# Patient Record
Sex: FEMALE | Race: WHITE | NOT HISPANIC OR LATINO | Employment: OTHER | URBAN - METROPOLITAN AREA
[De-identification: names, ages, dates, MRNs, and addresses within clinical notes are randomized per-mention and may not be internally consistent; named-entity substitution may affect disease eponyms.]

---

## 2024-02-08 ENCOUNTER — OFFICE VISIT (OUTPATIENT)
Dept: CARDIOLOGY CLINIC | Facility: CLINIC | Age: 69
End: 2024-02-08
Payer: MEDICARE

## 2024-02-08 VITALS
BODY MASS INDEX: 35.53 KG/M2 | SYSTOLIC BLOOD PRESSURE: 142 MMHG | WEIGHT: 181 LBS | OXYGEN SATURATION: 97 % | HEART RATE: 70 BPM | HEIGHT: 60 IN | DIASTOLIC BLOOD PRESSURE: 85 MMHG

## 2024-02-08 DIAGNOSIS — I21.4 NSTEMI (NON-ST ELEVATED MYOCARDIAL INFARCTION) (HCC): ICD-10-CM

## 2024-02-08 DIAGNOSIS — Z95.810 ICD (IMPLANTABLE CARDIOVERTER-DEFIBRILLATOR) IN PLACE: ICD-10-CM

## 2024-02-08 DIAGNOSIS — I25.10 CORONARY ARTERY DISEASE INVOLVING NATIVE CORONARY ARTERY OF NATIVE HEART WITHOUT ANGINA PECTORIS: Primary | ICD-10-CM

## 2024-02-08 DIAGNOSIS — I47.20 VT (VENTRICULAR TACHYCARDIA) (HCC): ICD-10-CM

## 2024-02-08 DIAGNOSIS — E78.00 HYPERCHOLESTEROLEMIA: ICD-10-CM

## 2024-02-08 DIAGNOSIS — I25.10 MULTIPLE VESSEL CORONARY ARTERY DISEASE: ICD-10-CM

## 2024-02-08 DIAGNOSIS — I10 PRIMARY HYPERTENSION: ICD-10-CM

## 2024-02-08 PROCEDURE — 99204 OFFICE O/P NEW MOD 45 MIN: CPT | Performed by: INTERNAL MEDICINE

## 2024-02-08 RX ORDER — ASPIRIN 81 MG/1
81 TABLET ORAL DAILY
COMMUNITY
Start: 2023-12-07

## 2024-02-08 RX ORDER — UREA 10 %
50 LOTION (ML) TOPICAL DAILY
COMMUNITY

## 2024-02-08 RX ORDER — METOPROLOL SUCCINATE 25 MG/1
75 TABLET, EXTENDED RELEASE ORAL EVERY 12 HOURS
COMMUNITY
Start: 2023-12-06

## 2024-02-08 RX ORDER — CLOPIDOGREL BISULFATE 75 MG/1
75 TABLET ORAL DAILY
COMMUNITY
Start: 2023-12-07

## 2024-02-08 RX ORDER — LOSARTAN POTASSIUM 25 MG/1
TABLET ORAL EVERY 24 HOURS
COMMUNITY
End: 2024-02-08 | Stop reason: SDUPTHER

## 2024-02-08 RX ORDER — LOSARTAN POTASSIUM 50 MG/1
50 TABLET ORAL DAILY
Qty: 90 TABLET | Refills: 3 | Status: SHIPPED | OUTPATIENT
Start: 2024-02-08

## 2024-02-08 RX ORDER — EZETIMIBE 10 MG/1
10 TABLET ORAL DAILY
COMMUNITY
Start: 2023-12-06

## 2024-02-08 RX ORDER — AMLODIPINE BESYLATE 10 MG/1
10 TABLET ORAL DAILY
COMMUNITY
Start: 2023-06-14 | End: 2024-06-13

## 2024-02-08 RX ORDER — ATORVASTATIN CALCIUM 80 MG/1
1 TABLET, FILM COATED ORAL EVERY EVENING
COMMUNITY
Start: 2023-12-06

## 2024-02-08 NOTE — PATIENT INSTRUCTIONS
Mediterranean Diet   AMBULATORY CARE:   A Mediterranean diet  is a meal plan that includes foods that are commonly eaten in countries that border the [place]. This meal plan may provide several health benefits. These include losing or maintaining weight, and decreasing blood pressure, blood sugar, and cholesterol levels. It may also help protect against certain health conditions such as heart disease, cancer, type 2 diabetes, and Alzheimer disease. Work with a dietitian to develop a meal plan that is right for you.  Foods to include in the Mediterranean diet:   Include fruits and vegetables in each meal.  Eat a variety of fresh fruits and vegetables.    Choose whole grains every day.  These foods include whole-grain breads, pastas, and cereals. It also includes brown rice, quinoa, and millet.    Use unsaturated fats instead of saturated fats.  Cook with olive or canola oil. Limit saturated fats, such as butter, margarine, and shortening. Saturated fat is an unhealthy fat that can increase your cholesterol levels.    Choose plant foods, poultry, and fish as your main sources of protein.      Eat plant-based foods that provide protein,  such as lentils, beans, chickpeas, nuts, and seeds. Choose mostly plant-based foods in place of meat on most days of the week.    Eat protein foods high in omega-3 fats.  Fish high in omega-3 fats include salmon, trout, and tuna. Include these types of fish 1 or 2 times each week. Limit fish high in mercury, such as shark, swordfish, tilefish, and liam mackerel. Omega-3 fats are also found in walnuts and flaxseed.         Choose poultry (chicken or turkey)  without skin instead of red meat. Red meat is high in saturated fat. Limit eggs and high-fat meats, such as beltrán, sausage, and hot dogs.    Choose low-fat dairy foods  such as nonfat or 1% milk, or low-fat almond, cashew, or soy milk. Other examples include low-fat cheese, yogurt, and cottage cheese.    Limit sweets.   Limit your intake of high-sugar foods, such as soda, desserts, and candy.    Talk to your healthcare provider about alcohol.  Studies have shown that moderate intake of wine may reduce the risk of heart disease. A moderate amount of wine is 1 serving for women and men 65 years and older each day. Two servings is recommended for men 21 to 64 years of age each day. A serving of wine is 5 ounces.    Other things you need to know if you follow the Mediterranean diet:   Include foods high in iron and vitamin C.  Plant-based foods that are high in iron include spinach, beans, tofu, and artichoke. Eat a serving of vitamin C with any iron-rich food to help your body absorb more iron. Examples include oranges, strawberries, cantaloupe, broccoli, and yellow peppers.         Get regular physical activity.  The Mediterranean diet will have the most benefit if you get regular physical activity. Get 30 minutes of physical activity at least 5 days a week. Choose physical activities that increase your heart rate. Examples include walking, hiking, swimming, and riding a bike. Ask your healthcare provider about the best exercise plan for you.       © Copyright [employer] 2023 Information is for End User's use only and may not be sold, redistributed or otherwise used for commercial purposes.  The above information is an  only. It is not intended as medical advice for individual conditions or treatments. Talk to your doctor, nurse or pharmacist before following any medical regimen to see if it is safe and effective for you.

## 2024-02-08 NOTE — PROGRESS NOTES
Cardiology Consultation     Michelle Espino  [id number]  1955  [facility] [address] [address] [phone]    1. Coronary artery disease involving native coronary artery of native heart without angina pectoris  Ambulatory  Referral to Cardiac Rehabilitation    CBC (Includes Diff/Plt) (Refl)    Comprehensive metabolic panel    Lipid Panel With Direct LDL      2. Multiple vessel coronary artery disease  Ambulatory  Referral to Cardiac Rehabilitation      3. Primary hypertension  losartan (COZAAR) 50 mg tablet      4. NSTEMI (non-ST elevated myocardial infarction) (HCC)  Ambulatory  Referral to Cardiac Rehabilitation      5. VT (ventricular tachycardia) ([facility])        6. Hypercholesterolemia        7. ICD (implantable cardioverter-defibrillator) in place            Discussion/Summary:    1.  Multivessel CAD - After a recent NSTEMI and VT/PEA arrest Michelle was found to have multivessel disease.  The culprit of her MI appeared to be an occluded OM1, but she had diffuse disease of her left circumflex starting proximally along with a mid long eccentric stenosis of her LAD at 80%.  She was not a candidate for intervention, and CT surgery was consulted.  They stated there was no good targets distally and medical management was recommended.  She is here for a second opinion and she did bring her records with her cardiac catheterization films.  I am going to have interventional cardiology and CT surgery review these with me to determine the feasibility of CABG versus PCI.            Fortunately she has not had any angina since coming out of the hospital, but she has not exerted herself to the degree she used to.  She is also having some shortness of breath.  I am going to refer her to cardiac rehabilitation.  She will continue medical therapy that includes dual antiplatelet therapy, beta-blocker and  statin.  Her LV function is normal by echocardiogram.  Follow-up will be determined after we review her films.    2.   Ventricular tachycardia - At the time of her presentation she had VTA that was reported integrate into PEA arrest, that only lasted less than a minute.  She received lidocaine and CPR.  She is now status post ICD and on beta-blocker therapy.  We are going to get her established with our device clinic to continue to follow for recurrent arrhythmias.    3.   Hypertension - Her blood pressure is mildly elevated.  It is recommended that she follows this at home.  She will continue Toprol-XL at 75 mg twice daily and we will increase losartan to 50 mg daily.  She is also on amlodipine 10 mg daily.    4.  Hypercholesterolemia - She is now on atorvastatin 80 mg daily with the addition of Zetia 10 mg daily.  We did order updated blood work.        HPI:    Mrs. Espino comes in today as a new patient for second opinion given her recent cardiac history and events.  She is a 68-year-old female who came into the hospital on 12/1/2023 with a couple days of what started as jaw pain but then progressed to substernal chest pain.  Prior to this she had no cardiac history and was treated for hypertension and hyperlipidemia.  While en route to the hospital via EMS she was noted to have pulseless VT and this degraded into what was reported as a PEA arrest.  She received an IV dose of lidocaine and CPR.  Her arrest did not last very long, less than a minute, and upon arrival to the emergency department ECG showed a new left bundle branch block.  She subsequently had a significantly elevated troponin.    At that point she was taken to the cardiac Cath Lab in which she had multivessel disease.  She had a eccentric long mid LAD 80% stenosis, a long diffuse 80% LCx stenosis starting proximally with a medium caliber OM1, that was torturous and occluded distally, and just mild plaquing of the RCA.  They reported they felt the  culprit of her NSTEMI was the occlusion of OM1.  They did not feel intervention was the best course given the long diffuse stenoses proximal to this in the LAD and therefore they consulted CT surgery.  Echocardiogram did show a preserved LV function.  By report, the patient was told she was not a candidate for CABG due to suboptimal coronary targets given her diffuse disease.  At that point she was placed on maximal and goal-directed medical therapy and an ICD was placed for her VT given the fact that she was not revascularized.  Follow-up was arranged, but then her and her  sought a second opinion today here with [facility].    Since getting out of the hospital Michelle is felt well.  She has not had any return of angina type symptoms that took her to the hospital.  She has been active but is not quite at the activity she once was.  She used to do the treadmill but has not done this since getting out of the hospital.  She is tolerating all of her medical therapy.  6 she will get some shortness of breath with exertion, particularly if she goes up stairs.  She usually will take a break and this will go away rather quickly.  She has not had any shortness of breath at rest.  No signs or symptoms of CHF.  She told me she was on a diuretic temporarily, but is currently not on 1.  She denies palpitations, lightheadedness or syncope.      Patient Active Problem List   Diagnosis    Coronary artery disease involving native coronary artery of native heart without angina pectoris    Multiple vessel coronary artery disease    NSTEMI (non-ST elevated myocardial infarction) (HCC)    VT (ventricular tachycardia) (HCC)    Hypercholesterolemia    ICD (implantable cardioverter-defibrillator) in place     History reviewed. No pertinent past medical history.  Social History     Socioeconomic History    Marital status: /Civil Union     Spouse name: Not on file    Number of children: Not on file    Years of education: Not on  file    Highest education level: Not on file   Occupational History    Not on file   Tobacco Use    Smoking status: Not on file    Smokeless tobacco: Not on file   Substance and Sexual Activity    Alcohol use: Not on file    Drug use: Not on file    Sexual activity: Not on file   Other Topics Concern    Not on file   Social History Narrative    Not on file     Social Determinants of Health     Financial Resource Strain: Not on file   Food Insecurity: Not on file   Transportation Needs: Not on file   Physical Activity: Not on file   Stress: Not on file   Social Connections: Not on file   Intimate Partner Violence: Not on file   Housing Stability: Not on file      Family History   Problem Relation Age of Onset    Hypertension Mother     Breast cancer Mother     Heart attack Father     Hypertension Sister     Cancer Sister     Heart attack Brother      Past Surgical History:   Procedure Laterality Date    CHOLECYSTECTOMY         Current Outpatient Medications:     amLODIPine (NORVASC) 10 mg tablet, Take 10 mg by mouth daily, Disp: , Rfl:     aspirin (ECOTRIN LOW STRENGTH) 81 mg EC tablet, Take 81 mg by mouth daily, Disp: , Rfl:     atorvastatin (LIPITOR) 80 mg tablet, Take 1 tablet by mouth every evening, Disp: , Rfl:     Cholecalciferol (Vitamin D3) 25 MCG (1000 UT) CAPS, Take 1,000 Units by mouth daily, Disp: , Rfl:     clopidogrel (PLAVIX) 75 mg tablet, Take 75 mg by mouth daily, Disp: , Rfl:     ezetimibe (ZETIA) 10 mg tablet, Take 10 mg by mouth daily, Disp: , Rfl:     losartan (COZAAR) 50 mg tablet, Take 1 tablet (50 mg total) by mouth daily, Disp: 90 tablet, Rfl: 3    metoprolol succinate (TOPROL-XL) 25 mg 24 hr tablet, Take 75 mg by mouth every 12 (twelve) hours, Disp: , Rfl:     Omega-3 Fatty Acids (OMEGA-3 FISH OIL PO), Take 1 capsule by mouth in the morning, Disp: , Rfl:     Thiamine HCl (Vitamin B1) 50 MG TABS, Take 50 mg by mouth daily, Disp: , Rfl:   Allergies   Allergen Reactions    Codeine Nausea Only  and Other (See Comments)     Nausea and abdominal pain     Vitals:    02/08/24 1411   BP: 142/85   BP Location: Right arm   Patient Position: Sitting   Cuff Size: Standard   Pulse: 70   SpO2: 97%   Weight: 82.1 kg (181 lb)   Height: 5' (1.524 m)       Labs:  Labs reviewed from her hospitalization in its entirety.    Imaging:  ECG obtained at the time of her hospitalization shows sinus rhythm with a left bundle branch block.    Review of Systems:  Review of Systems   Constitutional:  Positive for fatigue.   HENT: Negative.     Eyes: Negative.    Respiratory:  Positive for shortness of breath.    Cardiovascular: Negative.    Gastrointestinal: Negative.    Musculoskeletal: Negative.    Skin: Negative.    Allergic/Immunologic: Negative.    Neurological: Negative.    Hematological: Negative.    Psychiatric/Behavioral: Negative.     All other systems reviewed and are negative.    Vitals:    02/08/24 1411   BP: 142/85   BP Location: Right arm   Patient Position: Sitting   Cuff Size: Standard   Pulse: 70   SpO2: 97%   Weight: 82.1 kg (181 lb)   Height: 5' (1.524 m)       Physical Exam  Vitals and nursing note reviewed.   Constitutional:       Appearance: She is well-developed.   HENT:      Head: Normocephalic and atraumatic.   Eyes:      General: No scleral icterus.        Right eye: No discharge.         Left eye: No discharge.      Pupils: Pupils are equal, round, and reactive to light.   Neck:      Thyroid: No thyromegaly.      Vascular: No JVD.   Cardiovascular:      Rate and Rhythm: Normal rate and regular rhythm. No extrasystoles are present.     Pulses: Normal pulses. No decreased pulses.      Heart sounds: Normal heart sounds, S1 normal and S2 normal. No murmur heard.     No friction rub. No gallop.   Pulmonary:      Effort: Pulmonary effort is normal. No respiratory distress.      Breath sounds: Normal breath sounds. No wheezing, rhonchi or rales.   Abdominal:      General: Bowel sounds are normal. There is no  distension.      Palpations: Abdomen is soft.      Tenderness: There is no abdominal tenderness.   Musculoskeletal:         General: No tenderness or deformity. Normal range of motion.      Cervical back: Normal range of motion and neck supple.      Right lower leg: No edema.      Left lower leg: No edema.   Skin:     General: Skin is warm and dry.      Findings: No rash.   Neurological:      Mental Status: She is alert and oriented to person, place, and time.      Cranial Nerves: No cranial nerve deficit.   Psychiatric:         Thought Content: Thought content normal.         Judgment: Judgment normal.     Counseling / Coordination of Care  Total office time spent today 45 minutes.  Greater than 50% of total time was spent with the patient and / or family counseling and / or coordination of care.

## 2024-03-04 ENCOUNTER — TELEPHONE (OUTPATIENT)
Dept: CARDIOLOGY CLINIC | Facility: CLINIC | Age: 69
End: 2024-03-04

## 2024-03-04 NOTE — TELEPHONE ENCOUNTER
Pt left a message asking if you received her records, and if you had a chance to review them?    [phone]

## 2024-03-11 ENCOUNTER — CLINICAL SUPPORT (OUTPATIENT)
Dept: CARDIAC REHAB | Facility: HOSPITAL | Age: 69
End: 2024-03-11
Attending: INTERNAL MEDICINE
Payer: MEDICARE

## 2024-03-11 DIAGNOSIS — I21.4 NSTEMI (NON-ST ELEVATED MYOCARDIAL INFARCTION) (HCC): Primary | ICD-10-CM

## 2024-03-11 DIAGNOSIS — I25.10 MULTIPLE VESSEL CORONARY ARTERY DISEASE: ICD-10-CM

## 2024-03-11 DIAGNOSIS — I25.10 CORONARY ARTERY DISEASE INVOLVING NATIVE CORONARY ARTERY OF NATIVE HEART WITHOUT ANGINA PECTORIS: ICD-10-CM

## 2024-03-11 PROCEDURE — 93797 PHYS/QHP OP CAR RHAB WO ECG: CPT

## 2024-03-11 NOTE — PROGRESS NOTES
CARDIAC REHAB ASSESSMENT    Today's date: 2024  Patient name: Michelle Espino     : 1955       MRN: [id number]  PCP: Loly Khan DO  Referring Physician: Shay Garcia MD  Cardiologist: Dr. Garcia  Surgeon: n/a  Dx:   Encounter Diagnoses   Name Primary?    Coronary artery disease involving native coronary artery of native heart without angina pectoris     Multiple vessel coronary artery disease     NSTEMI (non-ST elevated myocardial infarction) ([facility])        Date of onset: 2023  Cultural needs: n/a    Weight  182.2 lb     Height:   Ht Readings from Last 1 Encounters:   24 5' (1.524 m)     Medical History: No past medical history on file.      Physical Limitations: none    Fall Risk: Low   Comments: Ambulates with a steady gait with no assist device    Anginal Equivalent: Chest Pain   NTG use: No prescription    Risk Factors   Cholesterol: Yes  Smoking: Never used  HTN: Yes  DM: No  Obesity: Yes   Inactivity: No  Stress:  perceived  stress: 4/10   Stressors:reports minimal stress   Goals for Stress Management:Practice Relaxation Techniques, Exercise, Keep a positive mindset, Spend time outside, Enjoy a hobby, and Spend time with family    Family History:  Family History   Problem Relation Age of Onset    Hypertension Mother     Breast cancer Mother     Heart attack Father     Hypertension Sister     Cancer Sister     Heart attack Brother        Allergies: Codeine    ETOH:   Social History     Substance and Sexual Activity   Alcohol Use Not on file       Current Medications:   Current Outpatient Medications   Medication Sig Dispense Refill    amLODIPine (NORVASC) 10 mg tablet Take 10 mg by mouth daily      aspirin (ECOTRIN LOW STRENGTH) 81 mg EC tablet Take 81 mg by mouth daily      atorvastatin (LIPITOR) 80 mg tablet Take 1 tablet by mouth every evening      Cholecalciferol (Vitamin D3) 25 MCG (1000 UT) CAPS Take 1,000 Units by mouth daily      clopidogrel (PLAVIX) 75 mg tablet Take  75 mg by mouth daily      ezetimibe (ZETIA) 10 mg tablet Take 10 mg by mouth daily      losartan (COZAAR) 50 mg tablet Take 1 tablet (50 mg total) by mouth daily 90 tablet 3    metoprolol succinate (TOPROL-XL) 25 mg 24 hr tablet Take 75 mg by mouth every 12 (twelve) hours      Omega-3 Fatty Acids (OMEGA-3 FISH OIL PO) Take 1 capsule by mouth in the morning      Thiamine HCl (Vitamin B1) 50 MG TABS Take 50 mg by mouth daily       No current facility-administered medications for this visit.         Functional Status Prior to Diagnosis for Treatment   Occupation: retired  Recreation: none  ADL’s: No limitations  [place]: No limitations  Exercise: walking on TM 30 min daily  Other: n/a    Current Functional Status  Occupation: retired  Recreation: none  ADL’s:resumed all ADLs limited by Dyspnea  [place]: No limitations  Exercise: has returned to 10 min daily on TM  Home exercise equipment: treadmill  Other: n/a      Patient Specific Goals:     EXERCISE GOALS (home exercise, ADLs):   Be able to walk up hill at home without having to stop due to shortness of breath  Continue to increase time on TM back to at least 30 min daily   NUTRITION GOALS (wt control, diabetes management, dietary modifications):   Continue current eating plan-following diabetic eating plan along with her   Lose weight - 10 lbs ST   PSYCHOCOSOCIAL GOALS (stress, emotional well being, social support):   Continue to have good relationships and support from family and friends      CORE COMPONENT GOALS (smoking, BP control, angina control, medication):   Manage BP with medication, diet, and exercise  Get second opinion on if PCI or CABG is possible to open 80% blockage in LAD       Ability to reach goals/rehabilitation potential:  Excellent    Projected return to function: 12 weeks  Objective tests: sub-max TM ETT      Nutritional   Reviewed details of Rate your Plate. Discussed key elements of heart healthy eating. Reviewed patient  goals for dietary modifications and their clinical implications.  Reviewed most recent lipid profile.     Goals for dietary modification (based on Rate Your Plate Dietary Assessment)   LDL <100, HDL >40, TRG <150, and CHOL <200    Psychosocial Assessment as it relates to rehabilitation  Are there any aspects of the patient's family and home situation that will affect their rehabilitation?  no    Assessment of depression and anxiety   Patient reports they are coping well with good social support and denies depression or anxiety  Reports sufficient emotional support     Psychosocial Evaluation    PHQ-9: 4  1-4 = Minimal Depression  ESTELA-7: 4  0-4  = Not anxious       Marital status:   Social Support: spouse, children, and friends    Domestic Violence Screening: No      REPORT OF CURRENT CARDIAC EVENT: per Dr. Garcia's note on 2/8/2024 she went to [facility] on [facility]/2023 with a couple days of what started as jaw pain but then progressed to substernal chest pain. Prior to this she had no cardiac history and was treated for hypertension and hyperlipidemia. While en route to the hospital via EMS she was noted to have pulseless VT and this degraded into what was reported as a PEA arrest. She received an IV dose of lidocaine and CPR. She had a eccentric long mid LAD 80% stenosis, a long diffuse 80% LCx stenosis starting proximally with a medium caliber OM1, that was torturous and occluded distally, and just mild plaquing of the RCA.  They reported they felt the culprit of her NSTEMI was the occlusion of OM1.  They did not feel intervention was the best course given the long diffuse stenoses proximal to this in the LAD and therefore they consulted CT surgery.  Echocardiogram did show a preserved LV function.  By report, the patient was told she was not a candidate for CABG due to suboptimal coronary targets given her diffuse disease.  At that point she was placed on maximal and goal-directed medical therapy and  an ICD was placed for her VT given the fact that she was not revascularized     OTHER CARDIAC HISTORY: none    COMMENTS: n/a

## 2024-03-11 NOTE — PROGRESS NOTES
Cardiac Rehabilitation Plan of Care   INITIAL           Today's date: 3/11/2024   # of Exercise Sessions Completed: 1-evaluation  Patient name: Michelle Espino      : 1955  Age: 68 y.o.       MRN: [id number]  Referring Physician: Shay Garcia MD  Cardiologist: Dr. Shay Garcia    Provider: Elisabeth  Clinician: Yuni Evans MS, CEP      Dx:   Encounter Diagnoses   Name Primary?    Coronary artery disease involving native coronary artery of native heart without angina pectoris     Multiple vessel coronary artery disease     NSTEMI (non-ST elevated myocardial infarction) ([facility])        Description of Diagnosis: She had a eccentric long mid LAD 80% stenosis, a long diffuse 80% LCx stenosis starting proximally with a medium caliber OM1, that was torturous and occluded distally, and just mild plaquing of the RCA.  They reported they felt the culprit of her NSTEMI was the occlusion of OM1.     Date of onset: 2023        Dr. Garcia,   Please review the following patient assessment for Michelle to begin cardiac rehabilitation post NSTEMI.  Please verify you agree with the outlined plan of care and exercise prescription by signing with attached order.    Thank You.      SUMMARY OF PROGRESS:  Mrs. Espino is here today for her cardiac rehabilitation evaluation after recent NSTEMI. She went into the hospital on 2023 with a couple days of what started as jaw pain but then progressed to substernal chest pain. Prior to this she had no cardiac history and was treated for hypertension and hyperlipidemia. While en route to the hospital via EMS she was noted to have pulseless VT and this degraded into what was reported as a PEA arrest. She received an IV dose of lidocaine and CPR.  She had a eccentric long mid LAD 80% stenosis, a long diffuse 80% LCx stenosis starting proximally with a medium caliber OM1, that was torturous and occluded distally, and just mild plaquing of the RCA. They reported they felt the  culprit of her NSTEMI was the occlusion of OM1.  They did not feel intervention was the best course given the long diffuse stenoses proximal to this in the LAD and therefore they consulted CT surgery. She was not a candidate for CABG. She made an appointment to see Dr. Garcia at [facility] so she could have a second opinion. She is waiting to hear back from Dr. Garcia after he has CT review. She had ICD implanted as well due to not being revascularized.  In the meantime, she is ready to start cardiac rehab. She reports she had been walking on her treadmill at home for 30 min daily until her heart attack. She is a little afraid to walk for 30 min since then due to MI starting while on treadmill and knowing she still has blockage in her artery. She has been walking for 10 min daily since MI. She reports following a diabetic diet along with her  who is diabetic. She follows low salt, low card, low fat diet most of the time.   She denies depression (PHQ-9=4) at this time. She does have some feelings of anxiety (ESTELA-7=4) since MI. She is hoping doing rehab and having supervised exercise will help that. She was given information on [employer] today and encouraged to enroll.   She completed TM ETT today reaching stage III at 4.3 METs at 8 min. She needed to stop due to shortness of breath. She did not reach THR. Telemetry shows LBBB. Will plan to progress exercise times and intensities as she tolerates over first 30 days of rehab. She has been instructed to let us know if she develops any angina or other symptoms with exercise.   She is in agreement to attend cardiac rehab sessions 3x/week for 12 weeks, or up to 36 sessions. She will plan to participate in weekly education sessions on cardiac risk factor management. She will start her sessions on 3/14/2024.      Medication compliance: Yes   Comments: Pt reports to be compliant with medications    Fall Risk: Low   Comments: Ambulates with a steady gait with no assist  device      EXERCISE ASSESSMENT and PLAN    ECG Interpretation: SR, LBBB    SMART Goals:   10% improvement in functional capacity based on max METs achieved in initial fitness assessment  reduced dyspnea with physical activity    improved DASI score by 10%  increased exercise capacity by 40% based on peak METs tolerated in cardiac rehab exercise session  maintain > 150 minutes per week of moderate intensity exercise    Patient Specific EXERCISE GOALS:   (home exercise, ADLs, recreation):  resume ADLs with increased strength, attend rehab regularly, and return to regular exercise regimen  Be able to walk up hill at home without having to stop due to shortness of breath  Continue to increase time on TM back to at least 30 min daily    Patient's progress toward SMART and personal goals: enrolled in CR today and is walking on treadmill at home 10 min daily since MI    Plan For next 30 days: education on home exercise guidelines, home exercise 30+ mins 2 days opposite CR, Group class: Risk Factors for Heart Disease, and patient will continue to increase minutes on TM at home to resume 30 min daily    Current Aerobic Exercise Prescription:      Frequency: 3 days/week   Supplement with home exercise 2+ days/wk as tolerated       Minutes: 20 - 40         METS: 2.5-3.5            HR: (50-85% HRR or RHR +30-40bpm):     RPE: 4-6         Modalities: Treadmill, UBE, Lifecycle, Rower, NuStep, and Recumbent bike     Exercise workloads will be progressed gradually as tolerated, within limits of patient's ability, and according to the patient's response to the exercise program.      30 Day Goals for Aerobic Exercise Progression:    Frequency: 3 days/week of cardiac rehab       Supplement with home exercise 2+ days/wk as tolerated    Minutes: 40                            >150 mins/wk of moderate intensity exercise   METS: 2.5-3.5   HR:      RPE: 4-6   Modalities: Treadmill, UBE, Lifecycle, and Rower, Nustep, Rec  Bike    Strength trainin-3 days / week  12-15 repetitions  1-2 sets per modality   Will be added following 2-3 weeks of monitored exercise sessions   Modalities: Pull Downs, Lateral Raise, Arm Extension, Arm Curl, Upright Rows, Front Raises, and leg ext    Home Exercise: Type: walking on TM, Frequency: 7 days/week, Duration: 10 mins    Group and Individual Education: benefit of exercise for CAD risk factors, home exercise guidelines, AHA guidelines to achieve >150 mins/wk of moderate exercise, RPE scale, and Group class: Risk Factors for Heart Disease       Readiness to change: Maintenance: (Maintaining the behavior change)      NUTRITION ASSESSMENT AND PLAN    Weight control:    Starting weight: 182.2 lb   Current weight:         Diabetes: N/A  A1c: 6.1    last measured: 2023    Lipid management: Discussed diet and lipid management and Last lipid profile 2023  Chol 233    HDL 58      SMART Goals:   LDL <100, HDL >40, TRG <150, CHOL <200, Wt. loss 1 ppw,  goal of -10 lbs., and 2.5-5%  wt loss    Patient Specific NUTRITION GOALS:  (wt control, diabetes management, dietary modifications): Continue current eating plan-following diabetic eating plan along with her   Lose weight - 10 lbs ST    Patient's progress toward SMART and personal goals: currently follows low fat, low sat, low carb eating plan with her , plans to increase exercise time to help work towards weight loss goal    Plan for next 30 days: patient specific plan: continue current eating plan, increase exercise time to reach 150-200 min/week of aerobic exercise, group class: Reading Food Labels, and group Class: Heart Healthy Eating    Measurable goals were based Rate Your Plate Dietary Self-Assessment. These are the areas in which the patient could score higher on the assessment.  Goals include recommendations for a heart healthy diet based on American Heart Association.    Group and Individual Education: heart  healthy eating principles  weight loss and management strategies  low sodium diet  maintaining hydration  nutrition for  lipid management  portion control    Readiness to change: Preparation:  (Getting ready to change)       PSYCHOSOCIAL ASSESSMENT AND PLAN    Emotional:  Depression assessment:  PHQ-9 = 4  1-4 = Minimal Depression            Anxiety measure:  ESTELA-7 = 4  0-4  = Not anxious    Assessment of depression and anxiety    Patient reports they are coping well with good social support and denies depression or anxiety  Reports sufficient emotional support     Self-reported stress level:  4  Stress Management: Practice Relaxation Techniques, Exercise, Keep a positive mindset, Spend time outside, Enjoy a hobby, and Spend time with family    Patient's rating of Social support: Excellent   Social Support Network: spouse and children    Psychosocial Assessment as it relates to rehabilitation:   Patient denies issues with his/her family or home life that may affect their rehabilitation efforts.     SMART Goals:     Physical Fitness in [place] Score < 3, Daily Activity in [place] Score < 3, reduced time feeling nervous or on edge, control or stop worrying, take time to relax, and avoid thoughts of feeling as though something bad may happen    Patient Specific PSYCHOCOSOCIAL GOALS:  (stress, emotional well being, social support):  begin using [employer] and spend time with family    Patient's progress toward SMART and personal goals: Continues to have good relationships and support from family and friends     Plan For next 30 days: Enroll in [employer], Exercise, and Keep a positive mindset    Group and Individual Education: signs/sxs of depression, benefits of a positive support system, and benefits of enrolling in [employer]    Readiness to change: Preparation:  (Getting ready to change)       OTHER CORE COMPONENTS     Tobacco:   Social History     Tobacco Use   Smoking Status Not on file   Smokeless  Tobacco Not on file       Tobacco Use Intervention:   N/A:  Patient is a non-smoker     Anginal Symptoms:   chest, jaw pain   NTG use: No prescription    Blood pressure:    Restin/72   Exercise: 148/72    SMART Goals: consistent, controlled resting BP < 130/80 and medication compliance      Patient Specific CORE COMPONENT GOALS (smoking, BP control, angina control, medication): reduced dietary sodium <2000mg and medication compliance  Manage BP with medication, diet, and exercise  Get second opinion on if PCI or CABG is possible to open 80% blockage in LAD    Patient's progress toward SMART and personal goals: taking medication 100% of time, following low salt diet, will be increasing exercise time at CR and on treadmill at home to reach 150-200 min.week of aerobic exercise    Plan For next 30 days: patient specific plan: will be increasing exercise time at CR and on treadmill at home to reach 150-200 min.week of aerobic exercise, group class: Understanding Heart Disease, group class: Common Heart Medications, and medication compliance    Group and Individual Education:  understanding high blood pressure and it's relationship to CAD and components of blood pressure management    Readiness to change: Action:  (Changing behavior)

## 2024-03-14 ENCOUNTER — CLINICAL SUPPORT (OUTPATIENT)
Dept: CARDIAC REHAB | Facility: HOSPITAL | Age: 69
End: 2024-03-14
Attending: INTERNAL MEDICINE
Payer: MEDICARE

## 2024-03-14 DIAGNOSIS — I21.4 NSTEMI (NON-ST ELEVATED MYOCARDIAL INFARCTION) (HCC): Primary | ICD-10-CM

## 2024-03-14 PROCEDURE — 93798 PHYS/QHP OP CAR RHAB W/ECG: CPT

## 2024-03-19 ENCOUNTER — CLINICAL SUPPORT (OUTPATIENT)
Dept: CARDIAC REHAB | Facility: HOSPITAL | Age: 69
End: 2024-03-19
Attending: INTERNAL MEDICINE
Payer: MEDICARE

## 2024-03-19 DIAGNOSIS — I21.4 NSTEMI (NON-ST ELEVATED MYOCARDIAL INFARCTION) (HCC): Primary | ICD-10-CM

## 2024-03-19 PROCEDURE — 93798 PHYS/QHP OP CAR RHAB W/ECG: CPT

## 2024-03-21 ENCOUNTER — CLINICAL SUPPORT (OUTPATIENT)
Dept: CARDIAC REHAB | Facility: HOSPITAL | Age: 69
End: 2024-03-21
Attending: INTERNAL MEDICINE
Payer: MEDICARE

## 2024-03-21 DIAGNOSIS — I21.4 NSTEMI (NON-ST ELEVATED MYOCARDIAL INFARCTION) (HCC): Primary | ICD-10-CM

## 2024-03-21 PROCEDURE — 93798 PHYS/QHP OP CAR RHAB W/ECG: CPT

## 2024-03-22 ENCOUNTER — CLINICAL SUPPORT (OUTPATIENT)
Dept: CARDIAC REHAB | Facility: HOSPITAL | Age: 69
End: 2024-03-22
Attending: INTERNAL MEDICINE
Payer: MEDICARE

## 2024-03-22 DIAGNOSIS — I21.4 NSTEMI (NON-ST ELEVATED MYOCARDIAL INFARCTION) (HCC): Primary | ICD-10-CM

## 2024-03-22 PROCEDURE — 93798 PHYS/QHP OP CAR RHAB W/ECG: CPT

## 2024-03-26 ENCOUNTER — CLINICAL SUPPORT (OUTPATIENT)
Dept: CARDIAC REHAB | Facility: HOSPITAL | Age: 69
End: 2024-03-26
Attending: INTERNAL MEDICINE
Payer: MEDICARE

## 2024-03-26 DIAGNOSIS — I21.4 NSTEMI (NON-ST ELEVATED MYOCARDIAL INFARCTION) (HCC): Primary | ICD-10-CM

## 2024-03-26 PROCEDURE — 93798 PHYS/QHP OP CAR RHAB W/ECG: CPT

## 2024-03-28 ENCOUNTER — CLINICAL SUPPORT (OUTPATIENT)
Dept: CARDIAC REHAB | Facility: HOSPITAL | Age: 69
End: 2024-03-28
Attending: INTERNAL MEDICINE
Payer: MEDICARE

## 2024-03-28 DIAGNOSIS — I21.4 NSTEMI (NON-ST ELEVATED MYOCARDIAL INFARCTION) (HCC): Primary | ICD-10-CM

## 2024-03-28 PROCEDURE — 93798 PHYS/QHP OP CAR RHAB W/ECG: CPT

## 2024-03-29 ENCOUNTER — CLINICAL SUPPORT (OUTPATIENT)
Dept: CARDIAC REHAB | Facility: HOSPITAL | Age: 69
End: 2024-03-29
Attending: INTERNAL MEDICINE
Payer: MEDICARE

## 2024-03-29 DIAGNOSIS — I21.4 NSTEMI (NON-ST ELEVATED MYOCARDIAL INFARCTION) (HCC): Primary | ICD-10-CM

## 2024-03-29 PROCEDURE — 93798 PHYS/QHP OP CAR RHAB W/ECG: CPT

## 2024-04-02 ENCOUNTER — APPOINTMENT (OUTPATIENT)
Dept: CARDIAC REHAB | Facility: HOSPITAL | Age: 69
End: 2024-04-02
Attending: INTERNAL MEDICINE
Payer: MEDICARE

## 2024-04-04 ENCOUNTER — CLINICAL SUPPORT (OUTPATIENT)
Dept: CARDIAC REHAB | Facility: HOSPITAL | Age: 69
End: 2024-04-04
Attending: INTERNAL MEDICINE
Payer: MEDICARE

## 2024-04-04 DIAGNOSIS — I21.4 NSTEMI (NON-ST ELEVATED MYOCARDIAL INFARCTION) (HCC): Primary | ICD-10-CM

## 2024-04-04 PROCEDURE — 93798 PHYS/QHP OP CAR RHAB W/ECG: CPT

## 2024-04-05 ENCOUNTER — CLINICAL SUPPORT (OUTPATIENT)
Dept: CARDIAC REHAB | Facility: HOSPITAL | Age: 69
End: 2024-04-05
Attending: INTERNAL MEDICINE
Payer: MEDICARE

## 2024-04-05 DIAGNOSIS — I21.4 NSTEMI (NON-ST ELEVATED MYOCARDIAL INFARCTION) (HCC): Primary | ICD-10-CM

## 2024-04-05 PROCEDURE — 93798 PHYS/QHP OP CAR RHAB W/ECG: CPT

## 2024-04-09 ENCOUNTER — CLINICAL SUPPORT (OUTPATIENT)
Dept: CARDIAC REHAB | Facility: HOSPITAL | Age: 69
End: 2024-04-09
Attending: INTERNAL MEDICINE
Payer: MEDICARE

## 2024-04-09 DIAGNOSIS — I21.4 NSTEMI (NON-ST ELEVATED MYOCARDIAL INFARCTION) (HCC): Primary | ICD-10-CM

## 2024-04-09 PROCEDURE — 93798 PHYS/QHP OP CAR RHAB W/ECG: CPT

## 2024-04-09 NOTE — PROGRESS NOTES
Cardiac Rehabilitation Plan of Care   30 DAY          Today's date: 2024   # of Exercise Sessions Completed: 11  Patient name: Michelle Espino      : 1955  Age: 68 y.o.       MRN: [id number]  Referring Physician: Shay Garcia MD  Cardiologist: Dr. Shay Garcia    Provider: Elisabeth  Clinician: Mariza Pablo MS       Dx:   Encounter Diagnosis   Name Primary?    NSTEMI (non-ST elevated myocardial infarction) ([facility]) Yes       Description of Diagnosis: She had a eccentric long mid LAD 80% stenosis, a long diffuse 80% LCx stenosis starting proximally with a medium caliber OM1, that was torturous and occluded distally, and just mild plaquing of the RCA.  They reported they felt the culprit of her NSTEMI was the occlusion of OM1.     Date of onset: 2023          SUMMARY OF PROGRESS:  Michelle has attended 10 exercise sessions in the past 30 days.   She tolerates 30-40 mins at 2.8 - 3.0 METs.  A light strength training component has not been added to their exercise program. and will be added in a future exercise session..   She is tolerating progression of intensity levels to maintain RPE 4-6.   Resting BP  108/64 - 154/92 with  hypertensive  response to exercise reaching 134/74- 198/88.  BBB on tele.  RHR 56 - 81  with Normal response to exercise reaching 76 - 136.    No cardiac complaints, but does report post exercise she notices swelling in her ankles and hands, her cardiologist was notified via tiger text and patient has reached out as well. Pt has been supplementing exercise at home walking for 15 minutes on her treadmill 2x/wk, she tolerates it well.  Patient attends group educational classes on cardiac risk factor modification.   Her exercise program will be progressed as tolerated to maintain RPE 4-6.  They will continue to be educated on lifestyle modification and encouraged to supplement with a home exercise program as tolerated. Pts goals included to increase veggie and fruit intake,  increase her overall daily hydration and has a weight loss goal of 30 lbs.        3/11 Mrs. Espino is here today for her cardiac rehabilitation evaluation after recent NSTEMI. She went into the hospital on 12/1/2023 with a couple days of what started as jaw pain but then progressed to substernal chest pain. Prior to this she had no cardiac history and was treated for hypertension and hyperlipidemia. While en route to the hospital via EMS she was noted to have pulseless VT and this degraded into what was reported as a PEA arrest. She received an IV dose of lidocaine and CPR.  She had a eccentric long mid LAD 80% stenosis, a long diffuse 80% LCx stenosis starting proximally with a medium caliber OM1, that was torturous and occluded distally, and just mild plaquing of the RCA. They reported they felt the culprit of her NSTEMI was the occlusion of OM1.  They did not feel intervention was the best course given the long diffuse stenoses proximal to this in the LAD and therefore they consulted CT surgery. She was not a candidate for CABG. She made an appointment to see Dr. Garcia at [facility] so she could have a second opinion. She is waiting to hear back from Dr. Garcia after he has CT review. She had ICD implanted as well due to not being revascularized.  In the meantime, she is ready to start cardiac rehab. She reports she had been walking on her treadmill at home for 30 min daily until her heart attack. She is a little afraid to walk for 30 min since then due to MI starting while on treadmill and knowing she still has blockage in her artery. She has been walking for 10 min daily since MI. She reports following a diabetic diet along with her  who is diabetic. She follows low salt, low card, low fat diet most of the time.   She denies depression (PHQ-9=4) at this time. She does have some feelings of anxiety (ESTELA-7=4) since MI. She is hoping doing rehab and having supervised exercise will help that. She was given  information on [employer] today and encouraged to enroll.   She completed TM ETT today reaching stage III at 4.3 METs at 8 min. She needed to stop due to shortness of breath. She did not reach THR. Telemetry shows LBBB. Will plan to progress exercise times and intensities as she tolerates over first 30 days of rehab. She has been instructed to let us know if she develops any angina or other symptoms with exercise.   She is in agreement to attend cardiac rehab sessions 3x/week for 12 weeks, or up to 36 sessions. She will plan to participate in weekly education sessions on cardiac risk factor management. She will start her sessions on 3/14/2024.      Medication compliance: Yes   Comments: Pt reports to be compliant with medications    Fall Risk: Low   Comments: Ambulates with a steady gait with no assist device      EXERCISE ASSESSMENT and PLAN    ECG Interpretation: SR, LBBB    SMART Goals:   10% improvement in functional capacity based on max METs achieved in initial fitness assessment  reduced dyspnea with physical activity    improved DASI score by 10%  increased exercise capacity by 40% based on peak METs tolerated in cardiac rehab exercise session  maintain > 150 minutes per week of moderate intensity exercise    Patient Specific EXERCISE GOALS:   (home exercise, ADLs, recreation):  resume ADLs with increased strength, attend rehab regularly, and return to regular exercise regimen  Be able to walk up hill at home without having to stop due to shortness of breath  Continue to increase time on TM back to at least 30 min daily    Patient's progress toward SMART and personal goals: enrolled in CR today and is walking on treadmill at home 10 min daily since MI    Plan For next 30 days: education on home exercise guidelines, home exercise 30+ mins 2 days opposite CR, Group class: Risk Factors for Heart Disease, and patient will continue to increase minutes on TM at home to resume 30 min daily    Current Aerobic  Exercise Prescription:      Frequency: 3 days/week   Supplement with home exercise 2+ days/wk as tolerated       Minutes: 30-40         METS: 2.8-3.0            HR: (50-85% HRR or RHR +30-40bpm):    RPE: 4-6         Modalities: Treadmill, UBE, NuStep, and Recumbent bike     Exercise workloads will be progressed gradually as tolerated, within limits of patient's ability, and according to the patient's response to the exercise program.      30 Day Goals for Aerobic Exercise Progression:    Frequency: 3 days/week of cardiac rehab       Supplement with home exercise 2+ days/wk as tolerated    Minutes: 40                            >150 mins/wk of moderate intensity exercise   METS: 2.5-3.5   HR:      RPE: 4-6   Modalities: Treadmill, UBE, Lifecycle, and Rower, Nustep, Rec Bike    Strength trainin-3 days / week  12-15 repetitions  1-2 sets per modality   Will be added following 2-3 weeks of monitored exercise sessions   Modalities: Pull Downs, Lateral Raise, Arm Extension, Arm Curl, Upright Rows, Front Raises, and leg ext    Home Exercise: Type: walking on TM, Frequency: 7 days/week, Duration: 10 mins    Group and Individual Education: benefit of exercise for CAD risk factors, home exercise guidelines, AHA guidelines to achieve >150 mins/wk of moderate exercise, RPE scale, and Group class: Risk Factors for Heart Disease       Readiness to change: Maintenance: (Maintaining the behavior change)      NUTRITION ASSESSMENT AND PLAN    Weight control:    Starting weight: 182.2 lb   Current weight:   181.6 lbs      Diabetes: N/A  A1c: 6.1    last measured: 2023    Lipid management: Discussed diet and lipid management and Last lipid profile 2023  Chol 233    HDL 58      SMART Goals:   LDL <100, HDL >40, TRG <150, CHOL <200, Wt. loss 1 ppw,  goal of -10 lbs., and 2.5-5%  wt loss    Patient Specific NUTRITION GOALS:  (wt control, diabetes management, dietary modifications): Continue current  eating plan-following diabetic eating plan along with her   Lose weight - 10 lbs ST    Patient's progress toward SMART and personal goals: currently follows low fat, low sat, low carb eating plan with her , plans to increase exercise time to help work towards weight loss goal    Plan for next 30 days: patient specific plan: continue current eating plan, increase exercise time to reach 150-200 min/week of aerobic exercise, group class: Reading Food Labels, and group Class: Heart Healthy Eating    Measurable goals were based Rate Your Plate Dietary Self-Assessment. These are the areas in which the patient could score higher on the assessment.  Goals include recommendations for a heart healthy diet based on American Heart Association.    Group and Individual Education: heart healthy eating principles  weight loss and management strategies  low sodium diet  maintaining hydration  nutrition for  lipid management  portion control    Readiness to change: Action:  (Changing behavior)      PSYCHOSOCIAL ASSESSMENT AND PLAN    Emotional:  Depression assessment:  PHQ-9 = 4  1-4 = Minimal Depression            Anxiety measure:  ESTELA-7 = 4  0-4  = Not anxious    Assessment of depression and anxiety    Patient reports they are coping well with good social support and denies depression or anxiety  Reports sufficient emotional support     Self-reported stress level:  4  Stress Management: Practice Relaxation Techniques, Exercise, Keep a positive mindset, Spend time outside, Enjoy a hobby, and Spend time with family    Patient's rating of Social support: Excellent   Social Support Network: spouse and children    Psychosocial Assessment as it relates to rehabilitation:   Patient denies issues with his/her family or home life that may affect their rehabilitation efforts.     SMART Goals:     Physical Fitness in [place] Score < 3, Daily Activity in [place] Score < 3, reduced time feeling nervous or on edge, control or  stop worrying, take time to relax, and avoid thoughts of feeling as though something bad may happen    Patient Specific PSYCHOCOSOCIAL GOALS:  (stress, emotional well being, social support):  begin using [employer] and spend time with family    Patient's progress toward SMART and personal goals: Continues to have good relationships and support from family and friends, reports anxiety and rates her overall stress a 5/10 which is manageable     Plan For next 30 days: Enroll in [employer], Exercise, and Keep a positive mindset    Group and Individual Education: signs/sxs of depression, benefits of a positive support system, and benefits of enrolling in [employer]    Readiness to change: Maintenance: (Maintaining the behavior change)      OTHER CORE COMPONENTS     Tobacco:   Social History     Tobacco Use   Smoking Status Not on file   Smokeless Tobacco Not on file       Tobacco Use Intervention:   N/A:  Patient is a non-smoker     Anginal Symptoms:   chest, jaw pain   NTG use: No prescription    Blood pressure:    Restin//92   Exercise: 134//88    SMART Goals: consistent, controlled resting BP < 130/80 and medication compliance      Patient Specific CORE COMPONENT GOALS (smoking, BP control, angina control, medication): reduced dietary sodium <2000mg and medication compliance  Manage BP with medication, diet, and exercise  Continue to watch overall sodium intake     Patient's progress toward SMART and personal goals: taking medication 100% of time, following low salt diet, will be increasing exercise time at CR and on treadmill at home to reach 150-200 min.week of aerobic exercise    Plan For next 30 days: patient specific plan: will be increasing exercise time at CR and on treadmill at home to reach 150-200 min.week of aerobic exercise, group class: Understanding Heart Disease, group class: Common Heart Medications, and medication compliance    Group and Individual Education:  understanding  high blood pressure and it's relationship to CAD and components of blood pressure management    Readiness to change: Action:  (Changing behavior)

## 2024-04-11 ENCOUNTER — CLINICAL SUPPORT (OUTPATIENT)
Dept: CARDIAC REHAB | Facility: HOSPITAL | Age: 69
End: 2024-04-11
Attending: INTERNAL MEDICINE
Payer: MEDICARE

## 2024-04-11 DIAGNOSIS — I21.4 NSTEMI (NON-ST ELEVATED MYOCARDIAL INFARCTION) (HCC): Primary | ICD-10-CM

## 2024-04-11 PROCEDURE — 93798 PHYS/QHP OP CAR RHAB W/ECG: CPT

## 2024-04-12 ENCOUNTER — CLINICAL SUPPORT (OUTPATIENT)
Dept: CARDIAC REHAB | Facility: HOSPITAL | Age: 69
End: 2024-04-12
Attending: INTERNAL MEDICINE
Payer: MEDICARE

## 2024-04-12 DIAGNOSIS — I21.4 NSTEMI (NON-ST ELEVATED MYOCARDIAL INFARCTION) (HCC): Primary | ICD-10-CM

## 2024-04-12 PROCEDURE — 93798 PHYS/QHP OP CAR RHAB W/ECG: CPT

## 2024-04-16 ENCOUNTER — CLINICAL SUPPORT (OUTPATIENT)
Dept: CARDIAC REHAB | Facility: HOSPITAL | Age: 69
End: 2024-04-16
Attending: INTERNAL MEDICINE
Payer: MEDICARE

## 2024-04-16 DIAGNOSIS — I21.4 NSTEMI (NON-ST ELEVATED MYOCARDIAL INFARCTION) (HCC): Primary | ICD-10-CM

## 2024-04-16 PROCEDURE — 93798 PHYS/QHP OP CAR RHAB W/ECG: CPT

## 2024-04-18 ENCOUNTER — CLINICAL SUPPORT (OUTPATIENT)
Dept: CARDIAC REHAB | Facility: HOSPITAL | Age: 69
End: 2024-04-18
Attending: INTERNAL MEDICINE
Payer: MEDICARE

## 2024-04-18 DIAGNOSIS — I21.4 NSTEMI (NON-ST ELEVATED MYOCARDIAL INFARCTION) (HCC): Primary | ICD-10-CM

## 2024-04-18 PROCEDURE — 93798 PHYS/QHP OP CAR RHAB W/ECG: CPT

## 2024-04-19 ENCOUNTER — CLINICAL SUPPORT (OUTPATIENT)
Dept: CARDIAC REHAB | Facility: HOSPITAL | Age: 69
End: 2024-04-19
Attending: INTERNAL MEDICINE
Payer: MEDICARE

## 2024-04-19 DIAGNOSIS — I21.4 NSTEMI (NON-ST ELEVATED MYOCARDIAL INFARCTION) (HCC): Primary | ICD-10-CM

## 2024-04-19 PROCEDURE — 93798 PHYS/QHP OP CAR RHAB W/ECG: CPT

## 2024-04-23 ENCOUNTER — CLINICAL SUPPORT (OUTPATIENT)
Dept: CARDIAC REHAB | Facility: HOSPITAL | Age: 69
End: 2024-04-23
Attending: INTERNAL MEDICINE
Payer: MEDICARE

## 2024-04-23 DIAGNOSIS — I21.4 NSTEMI (NON-ST ELEVATED MYOCARDIAL INFARCTION) (HCC): Primary | ICD-10-CM

## 2024-04-23 PROCEDURE — 93798 PHYS/QHP OP CAR RHAB W/ECG: CPT

## 2024-04-25 ENCOUNTER — CLINICAL SUPPORT (OUTPATIENT)
Dept: CARDIAC REHAB | Facility: HOSPITAL | Age: 69
End: 2024-04-25
Attending: INTERNAL MEDICINE
Payer: MEDICARE

## 2024-04-25 DIAGNOSIS — I21.4 NSTEMI (NON-ST ELEVATED MYOCARDIAL INFARCTION) (HCC): Primary | ICD-10-CM

## 2024-04-25 PROCEDURE — 93798 PHYS/QHP OP CAR RHAB W/ECG: CPT

## 2024-04-26 ENCOUNTER — CLINICAL SUPPORT (OUTPATIENT)
Dept: CARDIAC REHAB | Facility: HOSPITAL | Age: 69
End: 2024-04-26
Attending: INTERNAL MEDICINE
Payer: MEDICARE

## 2024-04-26 DIAGNOSIS — I21.4 NSTEMI (NON-ST ELEVATED MYOCARDIAL INFARCTION) (HCC): Primary | ICD-10-CM

## 2024-04-26 PROCEDURE — 93798 PHYS/QHP OP CAR RHAB W/ECG: CPT

## 2024-04-30 ENCOUNTER — TELEPHONE (OUTPATIENT)
Age: 69
End: 2024-04-30

## 2024-04-30 ENCOUNTER — APPOINTMENT (OUTPATIENT)
Dept: CARDIAC REHAB | Facility: HOSPITAL | Age: 69
End: 2024-04-30
Attending: INTERNAL MEDICINE
Payer: MEDICARE

## 2024-04-30 NOTE — TELEPHONE ENCOUNTER
Caller: Michelle Espino     Doctor: Dr. Shay Garcia     Reason for call: patient stated that she received a call from Dr. Garcia about some records and test that were sent from her previous provider and he called to discuss w/ her but missed the call. Patient is requesting a call back but after 10am when she is free, after rehab.     Call back#: [phone]

## 2024-05-02 ENCOUNTER — CLINICAL SUPPORT (OUTPATIENT)
Dept: CARDIAC REHAB | Facility: HOSPITAL | Age: 69
End: 2024-05-02
Attending: INTERNAL MEDICINE
Payer: MEDICARE

## 2024-05-02 DIAGNOSIS — I21.4 NSTEMI (NON-ST ELEVATED MYOCARDIAL INFARCTION) (HCC): Primary | ICD-10-CM

## 2024-05-02 PROCEDURE — 93798 PHYS/QHP OP CAR RHAB W/ECG: CPT

## 2024-05-03 ENCOUNTER — CLINICAL SUPPORT (OUTPATIENT)
Dept: CARDIAC REHAB | Facility: HOSPITAL | Age: 69
End: 2024-05-03
Attending: INTERNAL MEDICINE
Payer: MEDICARE

## 2024-05-03 DIAGNOSIS — I21.4 NSTEMI (NON-ST ELEVATED MYOCARDIAL INFARCTION) (HCC): Primary | ICD-10-CM

## 2024-05-03 PROCEDURE — 93798 PHYS/QHP OP CAR RHAB W/ECG: CPT

## 2024-05-07 ENCOUNTER — CLINICAL SUPPORT (OUTPATIENT)
Dept: CARDIAC REHAB | Facility: HOSPITAL | Age: 69
End: 2024-05-07
Attending: INTERNAL MEDICINE
Payer: MEDICARE

## 2024-05-07 DIAGNOSIS — I21.4 NSTEMI (NON-ST ELEVATED MYOCARDIAL INFARCTION) (HCC): Primary | ICD-10-CM

## 2024-05-07 PROCEDURE — 93798 PHYS/QHP OP CAR RHAB W/ECG: CPT

## 2024-05-07 NOTE — PROGRESS NOTES
Cardiac Rehabilitation Plan of Care   60 DAY          Today's date: 2024   # of Exercise Sessions Completed: 22  Patient name: Michelle Espino      : 1955  Age: 68 y.o.       MRN: [id number]  Referring Physician: Shay Garcia MD  Cardiologist: Dr. Shay Garcia    Provider: Elisabeth  Clinician: Yuni Evans MS, CEP        Dx:   Encounter Diagnosis   Name Primary?    NSTEMI (non-ST elevated myocardial infarction) (HCC) Yes       Description of Diagnosis: She had a eccentric long mid LAD 80% stenosis, a long diffuse 80% LCx stenosis starting proximally with a medium caliber OM1, that was torturous and occluded distally, and just mild plaquing of the RCA.  They reported they felt the culprit of her NSTEMI was the occlusion of OM1.     Date of onset: 2023    Comments: Michelle continues to attend cardiac rehab sessions regularly 3x/week. She is doing well overall, but does have days when she is very fatigued with exercise sessions. She has noticed some chest pressure at times when she exerts herself and needs to decrease work levels. She reports she was contacted by Dr. Garcia and was told intervention may be possible for her which she is very pleased about. She will keep us updated on that plan.        Medication compliance: Yes   Comments: Pt reports to be compliant with medications    Fall Risk: Low   Comments: Ambulates with a steady gait with no assist device      EXERCISE ASSESSMENT and PLAN    ECG Interpretation: SR, LBBB    SMART Goals:   10% improvement in functional capacity based on max METs achieved in initial fitness assessment  reduced dyspnea with physical activity    improved DASI score by 10%  increased exercise capacity by 40% based on peak METs tolerated in cardiac rehab exercise session  maintain > 150 minutes per week of moderate intensity exercise    Patient Specific EXERCISE GOALS:   (home exercise, ADLs, recreation):  resume ADLs with increased strength, attend rehab  regularly, and return to regular exercise regimen  Be able to walk up hill at home without having to stop due to shortness of breath  Continue to increase time on TM back to at least 30 min daily    Patient's progress toward SMART and personal goals: she is continuing to attend CR sessions 3x/week, but is limited to 15 min on treadmill due to leg pain. She reports she is still not able to walk up hill at home without having shortness of breath and needs to continue working on that. Continues to use Treadmill at home 2x/week for 15 min at 2.5 mph as in rehab sessions.    Plan For next 30 days: Michelle is in agreement to continue attending CR sessions 3x/week and will continue walking on treadmill at home for home exercise 2x/week. Plan to try alternating increase in treadmill speed and incline to help improve endurance with walking and climbing hills. Will walk on hill at home also for home exercise to build endurance going up the hill and hoping to decrease need to stop to catch breath.    Current Aerobic Exercise Prescription:      Frequency: 3 days/week   Supplement with home exercise 2+ days/wk as tolerated       Minutes: 35-45        METS: 2.1-3.4           HR: (50-85% HRR or RHR +30-40bpm):    RPE: 4-6         Modalities: Treadmill, UBE, NuStep, and Recumbent bike     Exercise workloads will be progressed gradually as tolerated, within limits of patient's ability, and according to the patient's response to the exercise program.      30 Day Goals for Aerobic Exercise Progression:    Frequency: 3 days/week of cardiac rehab       Supplement with home exercise 2+ days/wk as tolerated    Minutes: 40-50                            >150 mins/wk of moderate intensity exercise   METS: 2.5-3.5   HR:      RPE: 4-6   Modalities: Treadmill, UBE, Lifecycle, and Rower, Nustep, Rec Bike    Strength trainin-3 days / week  12-15 repetitions  1-2 sets per modality   Will be added following 2-3 weeks of monitored  exercise sessions   Modalities: Pull Downs, Lateral Raise, Arm Extension, Arm Curl, Upright Rows, Front Raises, and leg ext    Home Exercise: Type: walking on TM, Frequency: 2 days/week, Duration: 15 mins    Group and Individual Education: benefit of exercise for CAD risk factors, home exercise guidelines, AHA guidelines to achieve >150 mins/wk of moderate exercise, RPE scale, and Group class: Risk Factors for Heart Disease       Readiness to change: Maintenance: (Maintaining the behavior change)      NUTRITION ASSESSMENT AND PLAN    Weight control:    Starting weight: 182.2 lb   Current weight:   184.8 lbs      Diabetes: N/A  A1c: 6.1    last measured: 12/1/2023    Lipid management: Discussed diet and lipid management and Last lipid profile 12/1/2023  Chol 233    HDL 58      SMART Goals:   LDL <100, HDL >40, TRG <150, CHOL <200, Wt. loss 1 ppw,  goal of -10 lbs., and 2.5-5%  wt loss    Patient Specific NUTRITION GOALS:  (wt control, diabetes management, dietary modifications): Continue current eating plan-following diabetic eating plan along with her   Lose weight - 10 lbs ST    Patient's progress toward SMART and personal goals: currently follows low fat, low sat, low carb eating plan with her , plans to increase exercise time to help work towards weight loss goal. No weight loss seen at this time.    Plan for next 30 days: patient specific plan: continue current eating plan, increase exercise time to reach 150-200 min/week of aerobic exercise, group class: [employer], and group Class: Heart Healthy Eating    Measurable goals were based Rate Your Plate Dietary Self-Assessment. These are the areas in which the patient could score higher on the assessment.  Goals include recommendations for a heart healthy diet based on American Heart Association.    Group and Individual Education: heart healthy eating principles  weight loss and management strategies  low sodium  diet  maintaining hydration  nutrition for  lipid management  portion control    Readiness to change: Action:  (Changing behavior)      PSYCHOSOCIAL ASSESSMENT AND PLAN    Emotional:  Depression assessment:  PHQ-9 = 4  1-4 = Minimal Depression            Anxiety measure:  ESTELA-7 = 4  0-4  = Not anxious    Assessment of depression and anxiety    Patient reports they are coping well with good social support and denies depression or anxiety  Reports sufficient emotional support     Self-reported stress level:  4  Stress Management: Practice Relaxation Techniques, Exercise, Keep a positive mindset, Spend time outside, Enjoy a hobby, and Spend time with family    Patient's rating of Social support: Excellent   Social Support Network: spouse and children    Psychosocial Assessment as it relates to rehabilitation:   Patient denies issues with his/her family or home life that may affect their rehabilitation efforts.     SMART Goals:     Physical Fitness in [place] Score < 3, Daily Activity in [place] Score < 3, reduced time feeling nervous or on edge, control or stop worrying, take time to relax, and avoid thoughts of feeling as though something bad may happen    Patient Specific PSYCHOCOSOCIAL GOALS:  (stress, emotional well being, social support):  begin using [employer] and spend time with family    Patient's progress toward SMART and personal goals: Continues to have good relationships and support from family and friends, reports anxiety and rates her overall stress a 5/10 which is manageable     Plan For next 30 days: Enroll in [employer], Exercise, and Keep a positive mindset    Group and Individual Education: signs/sxs of depression, benefits of a positive support system, and benefits of enrolling in [employer]    Readiness to change: Maintenance: (Maintaining the behavior change)      OTHER CORE COMPONENTS     Tobacco:   Social History     Tobacco Use   Smoking Status Not on file   Smokeless Tobacco Not on  file       Tobacco Use Intervention:   N/A:  Patient is a non-smoker     Anginal Symptoms:   chest, jaw pain   NTG use: No prescription    Blood pressure:    Restin//92   Exercise: 134//88    SMART Goals: consistent, controlled resting BP < 130/80 and medication compliance      Patient Specific CORE COMPONENT GOALS (smoking, BP control, angina control, medication): reduced dietary sodium <2000mg and medication compliance  Manage BP with medication, diet, and exercise  Continue to watch overall sodium intake     Patient's progress toward SMART and personal goals: taking medication 100% of time, following low salt diet, will be increasing exercise time at CR and on treadmill at home to reach 150-200 min.week of aerobic exercise    Plan For next 30 days: patient specific plan: will be increasing exercise time at CR and on treadmill at home to reach 150-200 min.week of aerobic exercise, group class: Understanding Heart Disease, group class: Common Heart Medications, and medication compliance    Group and Individual Education:  understanding high blood pressure and it's relationship to CAD and components of blood pressure management    Readiness to change: Action:  (Changing behavior)

## 2024-05-09 ENCOUNTER — CLINICAL SUPPORT (OUTPATIENT)
Dept: CARDIAC REHAB | Facility: HOSPITAL | Age: 69
End: 2024-05-09
Attending: INTERNAL MEDICINE
Payer: MEDICARE

## 2024-05-09 DIAGNOSIS — I21.4 NSTEMI (NON-ST ELEVATED MYOCARDIAL INFARCTION) (HCC): Primary | ICD-10-CM

## 2024-05-09 PROCEDURE — 93798 PHYS/QHP OP CAR RHAB W/ECG: CPT

## 2024-05-10 ENCOUNTER — CLINICAL SUPPORT (OUTPATIENT)
Dept: CARDIAC REHAB | Facility: HOSPITAL | Age: 69
End: 2024-05-10
Attending: INTERNAL MEDICINE
Payer: MEDICARE

## 2024-05-10 DIAGNOSIS — I21.4 NSTEMI (NON-ST ELEVATED MYOCARDIAL INFARCTION) (HCC): Primary | ICD-10-CM

## 2024-05-10 PROCEDURE — 93798 PHYS/QHP OP CAR RHAB W/ECG: CPT

## 2024-05-14 ENCOUNTER — CLINICAL SUPPORT (OUTPATIENT)
Dept: CARDIAC REHAB | Facility: HOSPITAL | Age: 69
End: 2024-05-14
Attending: INTERNAL MEDICINE
Payer: MEDICARE

## 2024-05-14 DIAGNOSIS — I21.4 NSTEMI (NON-ST ELEVATED MYOCARDIAL INFARCTION) (HCC): Primary | ICD-10-CM

## 2024-05-14 PROCEDURE — 93798 PHYS/QHP OP CAR RHAB W/ECG: CPT

## 2024-05-16 ENCOUNTER — CLINICAL SUPPORT (OUTPATIENT)
Dept: CARDIAC REHAB | Facility: HOSPITAL | Age: 69
End: 2024-05-16
Attending: INTERNAL MEDICINE
Payer: MEDICARE

## 2024-05-16 DIAGNOSIS — I21.4 NSTEMI (NON-ST ELEVATED MYOCARDIAL INFARCTION) (HCC): Primary | ICD-10-CM

## 2024-05-16 PROCEDURE — 93798 PHYS/QHP OP CAR RHAB W/ECG: CPT

## 2024-05-17 ENCOUNTER — CLINICAL SUPPORT (OUTPATIENT)
Dept: CARDIAC REHAB | Facility: HOSPITAL | Age: 69
End: 2024-05-17
Attending: INTERNAL MEDICINE
Payer: MEDICARE

## 2024-05-17 DIAGNOSIS — I21.4 NSTEMI (NON-ST ELEVATED MYOCARDIAL INFARCTION) (HCC): Primary | ICD-10-CM

## 2024-05-17 PROCEDURE — 93798 PHYS/QHP OP CAR RHAB W/ECG: CPT

## 2024-05-20 ENCOUNTER — TELEPHONE (OUTPATIENT)
Age: 69
End: 2024-05-20

## 2024-05-20 NOTE — TELEPHONE ENCOUNTER
Caller: Michelle    Doctor: Dr. Shay Garcia    Reason for call: Patient has additional questions regarding cardiac rehab and possible future cardiac intervention.    Call back#: [phone]

## 2024-05-21 ENCOUNTER — CLINICAL SUPPORT (OUTPATIENT)
Dept: CARDIAC REHAB | Facility: HOSPITAL | Age: 69
End: 2024-05-21
Attending: INTERNAL MEDICINE
Payer: MEDICARE

## 2024-05-21 DIAGNOSIS — I21.4 NSTEMI (NON-ST ELEVATED MYOCARDIAL INFARCTION) (HCC): Primary | ICD-10-CM

## 2024-05-21 PROCEDURE — 93798 PHYS/QHP OP CAR RHAB W/ECG: CPT

## 2024-05-23 ENCOUNTER — CLINICAL SUPPORT (OUTPATIENT)
Dept: CARDIAC REHAB | Facility: HOSPITAL | Age: 69
End: 2024-05-23
Attending: INTERNAL MEDICINE
Payer: MEDICARE

## 2024-05-23 DIAGNOSIS — I21.4 NSTEMI (NON-ST ELEVATED MYOCARDIAL INFARCTION) (HCC): Primary | ICD-10-CM

## 2024-05-23 PROCEDURE — 93798 PHYS/QHP OP CAR RHAB W/ECG: CPT

## 2024-05-24 ENCOUNTER — CLINICAL SUPPORT (OUTPATIENT)
Dept: CARDIAC REHAB | Facility: HOSPITAL | Age: 69
End: 2024-05-24
Attending: INTERNAL MEDICINE
Payer: MEDICARE

## 2024-05-24 DIAGNOSIS — I21.4 NSTEMI (NON-ST ELEVATED MYOCARDIAL INFARCTION) (HCC): Primary | ICD-10-CM

## 2024-05-24 PROCEDURE — 93798 PHYS/QHP OP CAR RHAB W/ECG: CPT

## 2024-05-28 ENCOUNTER — CLINICAL SUPPORT (OUTPATIENT)
Dept: CARDIAC REHAB | Facility: HOSPITAL | Age: 69
End: 2024-05-28
Attending: INTERNAL MEDICINE
Payer: MEDICARE

## 2024-05-28 DIAGNOSIS — I21.4 NSTEMI (NON-ST ELEVATED MYOCARDIAL INFARCTION) (HCC): Primary | ICD-10-CM

## 2024-05-28 PROCEDURE — 93798 PHYS/QHP OP CAR RHAB W/ECG: CPT

## 2024-05-30 ENCOUNTER — CLINICAL SUPPORT (OUTPATIENT)
Dept: CARDIAC REHAB | Facility: HOSPITAL | Age: 69
End: 2024-05-30
Attending: INTERNAL MEDICINE
Payer: MEDICARE

## 2024-05-30 DIAGNOSIS — I21.4 NSTEMI (NON-ST ELEVATED MYOCARDIAL INFARCTION) (HCC): Primary | ICD-10-CM

## 2024-05-30 PROCEDURE — 93798 PHYS/QHP OP CAR RHAB W/ECG: CPT

## 2024-05-31 ENCOUNTER — CLINICAL SUPPORT (OUTPATIENT)
Dept: CARDIAC REHAB | Facility: HOSPITAL | Age: 69
End: 2024-05-31
Attending: INTERNAL MEDICINE
Payer: MEDICARE

## 2024-05-31 DIAGNOSIS — I21.4 NSTEMI (NON-ST ELEVATED MYOCARDIAL INFARCTION) (HCC): Primary | ICD-10-CM

## 2024-05-31 PROCEDURE — 93798 PHYS/QHP OP CAR RHAB W/ECG: CPT

## 2024-05-31 NOTE — PROGRESS NOTES
Exercise session detail attached.    BP elevated today 160/98 at rest, 200/100 with exercise. Stopped exercise. Recovery 180/100. Patient reports taking medications late today 8:30 instead of 6:30 AM. She saw PCP earlier this week and was prescribed low dose HCTz due to recent weight gain and bloating. She is up another pound this week. She is to get blood work on Monday ordered by PCP to address weight gain.

## 2024-06-04 ENCOUNTER — CLINICAL SUPPORT (OUTPATIENT)
Dept: CARDIAC REHAB | Facility: HOSPITAL | Age: 69
End: 2024-06-04
Attending: INTERNAL MEDICINE
Payer: MEDICARE

## 2024-06-04 DIAGNOSIS — I21.4 NSTEMI (NON-ST ELEVATED MYOCARDIAL INFARCTION) (HCC): Primary | ICD-10-CM

## 2024-06-04 PROCEDURE — 93798 PHYS/QHP OP CAR RHAB W/ECG: CPT

## 2024-06-06 ENCOUNTER — CLINICAL SUPPORT (OUTPATIENT)
Dept: CARDIAC REHAB | Facility: HOSPITAL | Age: 69
End: 2024-06-06
Attending: INTERNAL MEDICINE
Payer: MEDICARE

## 2024-06-06 DIAGNOSIS — I21.4 NSTEMI (NON-ST ELEVATED MYOCARDIAL INFARCTION) (HCC): Primary | ICD-10-CM

## 2024-06-06 PROCEDURE — 93798 PHYS/QHP OP CAR RHAB W/ECG: CPT

## 2024-06-07 ENCOUNTER — CLINICAL SUPPORT (OUTPATIENT)
Dept: CARDIAC REHAB | Facility: HOSPITAL | Age: 69
End: 2024-06-07
Attending: INTERNAL MEDICINE
Payer: MEDICARE

## 2024-06-07 DIAGNOSIS — I21.4 NSTEMI (NON-ST ELEVATED MYOCARDIAL INFARCTION) (HCC): Primary | ICD-10-CM

## 2024-06-07 PROCEDURE — 93798 PHYS/QHP OP CAR RHAB W/ECG: CPT

## 2024-06-07 NOTE — PROGRESS NOTES
Cardiac Rehabilitation Plan of Care   DISCHARGE            Today's date: 2024   # of Exercise Sessions Completed: 36  Patient name: Michelle Espino      : 1955  Age: 68 y.o.       MRN: [id number]  Referring Physician: Shay Garcia MD  Cardiologist: Dr. Shay Garcia    Provider: Elisabeth  Clinician: Yuni Evans MS, CEP        Dx:   Encounter Diagnosis   Name Primary?    NSTEMI (non-ST elevated myocardial infarction) (HCC) Yes       Description of Diagnosis: She had a eccentric long mid LAD 80% stenosis, a long diffuse 80% LCx stenosis starting proximally with a medium caliber OM1, that was torturous and occluded distally, and just mild plaquing of the RCA.  They reported they felt the culprit of her NSTEMI was the occlusion of OM1.     Date of onset: 2023    Comments: Michelle has completed her cardiac rehab program at 36 sessions today. She has attended her cardiac rehab sessions regularly 3x/week. She has done very well overall, but continues to have days when she is very fatigued with exercise sessions. She has noticed some chest pressure at times when she exerts herself and needs to decrease work levels. She plans to maintain her exercise program using treadmill at home with goal of reaching >150 min of aerobic exercise per week.        Medication compliance: Yes   Comments: Pt reports to be compliant with medications    Fall Risk: Low   Comments: Ambulates with a steady gait with no assist device      EXERCISE ASSESSMENT and PLAN    ECG Interpretation: SR, LBBB    SMART Goals:   10% improvement in functional capacity based on max METs achieved in initial fitness assessment  reduced dyspnea with physical activity    improved DASI score by 10%  increased exercise capacity by 40% based on peak METs tolerated in cardiac rehab exercise session  maintain > 150 minutes per week of moderate intensity exercise    Patient Specific EXERCISE GOALS:   (home exercise, ADLs, recreation):  resume ADLs  with increased strength, attend rehab regularly, and return to regular exercise regimen  Be able to walk up hill at home without having to stop due to shortness of breath  Continue to increase time on TM back to at least 30 min daily    Patient's progress toward SMART and personal goals: Goals met: has attended 36 sessions of cardiac rehab, continues to be limited with walking time on treadmill due to leg pain, but reports less shortness of breath most days. Some days she does have increased shortness of breath with exercise. She feels it has been due to fluid retention and weight gain. She reports she is now able to walk up her hill at home without needing to take a break to rest and catch her breath.. Plans to continue using treadmill at home to maintain her exercise program with goal of reaching >150 min of aerobic exercise weekly.  She did not see increase in METs with TM ETT due to not being able to walk any faster due to her gait, but did show improvement with decreased HR and BP on assessment. However, she did improve daily METs from 2.8 to 3.6 METs showing 28.6% change in functional capacity  Plan after discharge: will continue to use treadmill daily at home to maintain her exercise program. Has been given discharge instructions and exercise goals.      Current Aerobic Exercise Prescription:      Frequency: 3 days/week   Supplement with home exercise 2+ days/wk as tolerated       Minutes: 35-45        METS: 2.1-3.6          HR: (50-85% HRR or RHR +30-40bpm):    RPE: 4-6         Modalities: Treadmill, UBE, NuStep, and Recumbent bike     Exercise workloads will be progressed gradually as tolerated, within limits of patient's ability, and according to the patient's response to the exercise program.      Exercise Progression after Discharge:    Frequency: 3-5 days of gym or home exercise   Minutes: 30-50  >150 mins/wk of moderate intensity exercise   METS: 2.5-4.0    HR: 76 - 136    RPE: 4-6   Modalities:  Treadmill      Strength trainin-3 days / week  12-15 repetitions  1-2 sets per modality   Will be added following 2-3 weeks of monitored exercise sessions   Modalities: Pull Downs, Lateral Raise, Arm Extension, Arm Curl, Upright Rows, Front Raises, and leg ext    Home Exercise: Type: walking on TM, Frequency: 2 days/week, Duration: 15 mins    Group and Individual Education: benefit of exercise for CAD risk factors, home exercise guidelines, AHA guidelines to achieve >150 mins/wk of moderate exercise, RPE scale, and Group class: Risk Factors for Heart Disease       Readiness to change: Maintenance: (Maintaining the behavior change)      NUTRITION ASSESSMENT AND PLAN    Weight control:    Starting weight: 182.2 lb   Current weight:   186.0 lbs      Diabetes: N/A  A1c: 6.1    last measured: 2023    Lipid management: Discussed diet and lipid management and Last lipid profile 2023  Chol 233    HDL 58      SMART Goals:   LDL <100, HDL >40, TRG <150, CHOL <200, Wt. loss 1 ppw,  goal of -10 lbs., and 2.5-5%  wt loss    Patient Specific NUTRITION GOALS:  (wt control, diabetes management, dietary modifications): Continue current eating plan-following diabetic eating plan along with her   Lose weight - 10 lbs ST    Patient's progress toward SMART and personal goals: Goals met: currently follows low fat, low sat, low carb eating plan with her ,.  Goals not met: weight loss -10 lbs. Will continue to work towards weight loss goal with diet and exercise. She has gained 5 lbs since starting her rehab program, weight has been fluctuating and increasing steadily over past month. She feels she is retaining fluid. She saw PCP this past week and is getting lab work to see what might be contributing to weight gain. She was also given a low dose of diuretic.        Plan for next 30 days: After discharge patient will continue to maintain healthy lifestyle habits and focus on risk factor  modification.    Measurable goals were based Rate Your Plate Dietary Self-Assessment. These are the areas in which the patient could score higher on the assessment.  Goals include recommendations for a heart healthy diet based on American Heart Association.    Group and Individual Education: heart healthy eating principles  weight loss and management strategies  low sodium diet  maintaining hydration  nutrition for  lipid management  portion control    Readiness to change: Action:  (Changing behavior)      PSYCHOSOCIAL ASSESSMENT AND PLAN    Emotional:  Depression assessment:  pre: PHQ-9 = 4  1-4 = Minimal Depression                                                                   Post: PHQ-9=4            Anxiety measure:  pre: ESTELA-7 = 4  0-4  = Not anxious                                                       Post: ESTELA-7=3    Assessment of depression and anxiety    Patient reports they are coping well with good social support and denies depression or anxiety  Reports sufficient emotional support     Self-reported stress level:  4  Stress Management: Practice Relaxation Techniques, Exercise, Keep a positive mindset, Spend time outside, Enjoy a hobby, and Spend time with family    Patient's rating of Social support: Excellent   Social Support Network: spouse and children    Psychosocial Assessment as it relates to rehabilitation:   Patient denies issues with his/her family or home life that may affect their rehabilitation efforts.     SMART Goals:     Physical Fitness in [place] Score < 3, Daily Activity in [place] Score < 3, reduced time feeling nervous or on edge, control or stop worrying, take time to relax, and avoid thoughts of feeling as though something bad may happen    Patient Specific PSYCHOCOSOCIAL GOALS:  (stress, emotional well being, social support):  begin using [employer] and spend time with family    Patient's progress toward SMART and personal goals: Goals met: Continues to have good  relationships and support from family and friends, reports anxiety and rates her overall stress a 5/10 which is manageable .  PHQ-9 and ESTELA-7 scores remain the same. No concerns with depression or anxiety at this time.    Plan For next 30 days: Enroll in [employer], Exercise, Keep a positive mindset, and After discharge patient will continue to maintain healthy lifestyle habits and focus on risk factor modification.    Group and Individual Education: signs/sxs of depression, benefits of a positive support system, and benefits of enrolling in [employer]    Readiness to change: Maintenance: (Maintaining the behavior change)      OTHER CORE COMPONENTS     Tobacco:   Social History     Tobacco Use   Smoking Status Not on file   Smokeless Tobacco Not on file       Tobacco Use Intervention:   N/A:  Patient is a non-smoker     Anginal Symptoms:   chest, jaw pain   NTG use: No prescription    Blood pressure:    Restin//92   Exercise: 134//88    SMART Goals: consistent, controlled resting BP < 130/80 and medication compliance      Patient Specific CORE COMPONENT GOALS (smoking, BP control, angina control, medication): reduced dietary sodium <2000mg and medication compliance  Manage BP with medication, diet, and exercise  Continue to watch overall sodium intake     Patient's progress toward SMART and personal goals: Goals met: taking medication 100% of time, following low salt diet, will be increasing exercise time at CR and on treadmill at home to reach 150-200 min.week of aerobic exercise. BP has been elevated at times. MD is aware and working on fluid retention and weight gain.      Plan For next 30 days: medication compliance and After discharge patient will continue to maintain healthy lifestyle habits and focus on risk factor modification.    Group and Individual Education:  understanding high blood pressure and it's relationship to CAD and components of blood pressure management    Readiness to  change: Action:  (Changing behavior)

## 2024-06-11 ENCOUNTER — APPOINTMENT (OUTPATIENT)
Dept: CARDIAC REHAB | Facility: HOSPITAL | Age: 69
End: 2024-06-11
Attending: INTERNAL MEDICINE
Payer: MEDICARE

## 2024-07-15 ENCOUNTER — REMOTE DEVICE CLINIC VISIT (OUTPATIENT)
Dept: CARDIOLOGY CLINIC | Facility: CLINIC | Age: 69
End: 2024-07-15
Payer: MEDICARE

## 2024-07-15 DIAGNOSIS — Z95.810 PRESENCE OF AUTOMATIC CARDIOVERTER/DEFIBRILLATOR (AICD): Primary | ICD-10-CM

## 2024-07-15 PROCEDURE — 93296 REM INTERROG EVL PM/IDS: CPT | Performed by: INTERNAL MEDICINE

## 2024-07-15 PROCEDURE — 93295 DEV INTERROG REMOTE 1/2/MLT: CPT | Performed by: INTERNAL MEDICINE

## 2024-07-15 NOTE — PROGRESS NOTES
Results for orders placed or performed in visit on 07/15/24   Cardiac EP device report    Narrative    CARELINK TRANSMISSION: BATTERY VOLTAGE ADEQUATE. (11.1 YRS)  1%. ALL AVAILABLE LEAD PARAMETERS WITHIN NORMAL LIMITS. NO SIGNIFICANT HIGH RATE EPISODES. OPTI-VOL WITHIN NORMAL LIMITS. NORMAL DEVICE FUNCTION.---GRIFFITH

## 2024-08-18 ENCOUNTER — HOSPITAL ENCOUNTER (EMERGENCY)
Facility: HOSPITAL | Age: 69
Discharge: HOME | End: 2024-08-18
Attending: EMERGENCY MEDICINE
Payer: MEDICARE

## 2024-08-18 ENCOUNTER — APPOINTMENT (EMERGENCY)
Dept: RADIOLOGY | Facility: HOSPITAL | Age: 69
End: 2024-08-18
Payer: MEDICARE

## 2024-08-18 VITALS
OXYGEN SATURATION: 94 % | DIASTOLIC BLOOD PRESSURE: 93 MMHG | HEART RATE: 72 BPM | SYSTOLIC BLOOD PRESSURE: 196 MMHG | RESPIRATION RATE: 18 BRPM | TEMPERATURE: 97.5 F

## 2024-08-18 DIAGNOSIS — I10 HYPERTENSION: Primary | ICD-10-CM

## 2024-08-18 DIAGNOSIS — N39.0 UTI (URINARY TRACT INFECTION): ICD-10-CM

## 2024-08-18 LAB
2HR DELTA HS TROPONIN: 1 NG/L
ALBUMIN SERPL BCG-MCNC: 4.8 G/DL (ref 3.5–5)
ALP SERPL-CCNC: 108 U/L (ref 34–104)
ALT SERPL W P-5'-P-CCNC: 20 U/L (ref 7–52)
ANION GAP SERPL CALCULATED.3IONS-SCNC: 9 MMOL/L (ref 4–13)
APTT PPP: 28 SECONDS (ref 23–34)
AST SERPL W P-5'-P-CCNC: 17 U/L (ref 13–39)
BACTERIA UR QL AUTO: ABNORMAL /HPF
BASOPHILS # BLD AUTO: 0.06 THOUSANDS/ÂΜL (ref 0–0.1)
BASOPHILS NFR BLD AUTO: 1 % (ref 0–1)
BILIRUB SERPL-MCNC: 1.27 MG/DL (ref 0.2–1)
BILIRUB UR QL STRIP: NEGATIVE
BUN SERPL-MCNC: 15 MG/DL (ref 5–25)
CALCIUM SERPL-MCNC: 10 MG/DL (ref 8.4–10.2)
CARDIAC TROPONIN I PNL SERPL HS: 10 NG/L
CARDIAC TROPONIN I PNL SERPL HS: 11 NG/L
CHLORIDE SERPL-SCNC: 104 MMOL/L (ref 96–108)
CLARITY UR: CLEAR
CO2 SERPL-SCNC: 28 MMOL/L (ref 21–32)
COLOR UR: ABNORMAL
CREAT SERPL-MCNC: 0.87 MG/DL (ref 0.6–1.3)
EOSINOPHIL # BLD AUTO: 0.17 THOUSAND/ÂΜL (ref 0–0.61)
EOSINOPHIL NFR BLD AUTO: 2 % (ref 0–6)
ERYTHROCYTE [DISTWIDTH] IN BLOOD BY AUTOMATED COUNT: 12.5 % (ref 11.6–15.1)
GFR SERPL CREATININE-BSD FRML MDRD: 69 ML/MIN/1.73SQ M
GLUCOSE SERPL-MCNC: 99 MG/DL (ref 65–140)
GLUCOSE UR STRIP-MCNC: NEGATIVE MG/DL
HCT VFR BLD AUTO: 47.3 % (ref 34.8–46.1)
HGB BLD-MCNC: 16.1 G/DL (ref 11.5–15.4)
HGB UR QL STRIP.AUTO: ABNORMAL
IMM GRANULOCYTES # BLD AUTO: 0.05 THOUSAND/UL (ref 0–0.2)
IMM GRANULOCYTES NFR BLD AUTO: 0 % (ref 0–2)
INR PPP: 1 (ref 0.85–1.19)
KETONES UR STRIP-MCNC: NEGATIVE MG/DL
LEUKOCYTE ESTERASE UR QL STRIP: ABNORMAL
LYMPHOCYTES # BLD AUTO: 2.37 THOUSANDS/ÂΜL (ref 0.6–4.47)
LYMPHOCYTES NFR BLD AUTO: 21 % (ref 14–44)
MCH RBC QN AUTO: 29.7 PG (ref 26.8–34.3)
MCHC RBC AUTO-ENTMCNC: 34 G/DL (ref 31.4–37.4)
MCV RBC AUTO: 87 FL (ref 82–98)
MONOCYTES # BLD AUTO: 0.65 THOUSAND/ÂΜL (ref 0.17–1.22)
MONOCYTES NFR BLD AUTO: 6 % (ref 4–12)
NEUTROPHILS # BLD AUTO: 7.9 THOUSANDS/ÂΜL (ref 1.85–7.62)
NEUTS SEG NFR BLD AUTO: 70 % (ref 43–75)
NITRITE UR QL STRIP: NEGATIVE
NON-SQ EPI CELLS URNS QL MICRO: ABNORMAL /HPF
NRBC BLD AUTO-RTO: 0 /100 WBCS
PH UR STRIP.AUTO: 6.5 [PH]
PLATELET # BLD AUTO: 263 THOUSANDS/UL (ref 149–390)
PMV BLD AUTO: 9.8 FL (ref 8.9–12.7)
POTASSIUM SERPL-SCNC: 3.4 MMOL/L (ref 3.5–5.3)
PROT SERPL-MCNC: 8 G/DL (ref 6.4–8.4)
PROT UR STRIP-MCNC: NEGATIVE MG/DL
PROTHROMBIN TIME: 13.7 SECONDS (ref 12.3–15)
RBC # BLD AUTO: 5.43 MILLION/UL (ref 3.81–5.12)
RBC #/AREA URNS AUTO: ABNORMAL /HPF
SODIUM SERPL-SCNC: 141 MMOL/L (ref 135–147)
SP GR UR STRIP.AUTO: <1.005 (ref 1–1.03)
UROBILINOGEN UR STRIP-ACNC: <2 MG/DL
WBC # BLD AUTO: 11.2 THOUSAND/UL (ref 4.31–10.16)
WBC #/AREA URNS AUTO: ABNORMAL /HPF

## 2024-08-18 PROCEDURE — 99284 EMERGENCY DEPT VISIT MOD MDM: CPT | Performed by: EMERGENCY MEDICINE

## 2024-08-18 PROCEDURE — 93005 ELECTROCARDIOGRAM TRACING: CPT

## 2024-08-18 PROCEDURE — 85610 PROTHROMBIN TIME: CPT | Performed by: EMERGENCY MEDICINE

## 2024-08-18 PROCEDURE — 85730 THROMBOPLASTIN TIME PARTIAL: CPT | Performed by: EMERGENCY MEDICINE

## 2024-08-18 PROCEDURE — 36415 COLL VENOUS BLD VENIPUNCTURE: CPT | Performed by: EMERGENCY MEDICINE

## 2024-08-18 PROCEDURE — 99285 EMERGENCY DEPT VISIT HI MDM: CPT

## 2024-08-18 PROCEDURE — 84484 ASSAY OF TROPONIN QUANT: CPT | Performed by: EMERGENCY MEDICINE

## 2024-08-18 PROCEDURE — 87086 URINE CULTURE/COLONY COUNT: CPT | Performed by: EMERGENCY MEDICINE

## 2024-08-18 PROCEDURE — 71045 X-RAY EXAM CHEST 1 VIEW: CPT

## 2024-08-18 PROCEDURE — 85025 COMPLETE CBC W/AUTO DIFF WBC: CPT | Performed by: EMERGENCY MEDICINE

## 2024-08-18 PROCEDURE — 80053 COMPREHEN METABOLIC PANEL: CPT | Performed by: EMERGENCY MEDICINE

## 2024-08-18 PROCEDURE — 81001 URINALYSIS AUTO W/SCOPE: CPT | Performed by: EMERGENCY MEDICINE

## 2024-08-18 RX ORDER — LOSARTAN POTASSIUM 25 MG/1
25 TABLET ORAL ONCE
Status: COMPLETED | OUTPATIENT
Start: 2024-08-18 | End: 2024-08-18

## 2024-08-18 RX ORDER — CEPHALEXIN 500 MG/1
500 CAPSULE ORAL 2 TIMES DAILY
Qty: 14 CAPSULE | Refills: 0 | Status: SHIPPED | OUTPATIENT
Start: 2024-08-19 | End: 2024-08-26

## 2024-08-18 RX ADMIN — METOPROLOL SUCCINATE 75 MG: 50 TABLET, EXTENDED RELEASE ORAL at 17:06

## 2024-08-18 RX ADMIN — LOSARTAN POTASSIUM 25 MG: 25 TABLET, FILM COATED ORAL at 18:18

## 2024-08-18 RX ADMIN — LOSARTAN POTASSIUM 25 MG: 25 TABLET, FILM COATED ORAL at 18:53

## 2024-08-18 RX ADMIN — CEPHALEXIN 500 MG: 250 CAPSULE ORAL at 17:06

## 2024-08-18 NOTE — ED PROVIDER NOTES
History  Chief Complaint   Patient presents with    Hypertension     C/o HTN. [place] light headed and checked BP at home. 195/97 at 1425 and 209/106 at 1500.      This is a 68-year-old female with a prior history of myocardial infarction and V. tach who presents for evaluation of hypertension and lightheadedness and mild headache.  She is currently on metoprolol losartan and amlodipine for blood pressure control most recent change was the metoprolol dosage increased to 75 mg in December she denies missing any medications no new stressors denies any alcohol or decongestants or excessive caffeine.  Initial blood pressure here was 238/113.  Denies any chest pain or shortness of breath      History provided by:  Patient and spouse  Medical Problem  Location:  Blood pressure  Quality:  Elevation  Onset quality:  Unable to specify  Timing:  Constant  Progression:  Waxing and waning  Chronicity:  New  Context:  Elevated blood pressure over the past 24 hours  Associated symptoms: headaches        Prior to Admission Medications   Prescriptions Last Dose Informant Patient Reported? Taking?   Cholecalciferol (Vitamin D3) 25 MCG (1000 UT) CAPS  Self Yes No   Sig: Take 1,000 Units by mouth daily   Omega-3 Fatty Acids (OMEGA-3 FISH OIL PO)  Self Yes No   Sig: Take 1 capsule by mouth in the morning   Thiamine HCl (Vitamin B1) 50 MG TABS  Self Yes No   Sig: Take 50 mg by mouth daily   amLODIPine (NORVASC) 10 mg tablet  Self Yes No   Sig: Take 10 mg by mouth daily   aspirin (ECOTRIN LOW STRENGTH) 81 mg EC tablet  Self Yes No   Sig: Take 81 mg by mouth daily   atorvastatin (LIPITOR) 80 mg tablet  Self Yes No   Sig: Take 1 tablet by mouth every evening   clopidogrel (PLAVIX) 75 mg tablet  Self Yes No   Sig: Take 75 mg by mouth daily   ezetimibe (ZETIA) 10 mg tablet  Self Yes No   Sig: Take 10 mg by mouth daily   losartan (COZAAR) 50 mg tablet   No No   Sig: Take 1 tablet (50 mg total) by mouth daily   metoprolol succinate (TOPROL-XL) 25  mg 24 hr tablet  Self Yes No   Sig: Take 75 mg by mouth every 12 (twelve) hours      Facility-Administered Medications: None       Past Medical History:   Diagnosis Date    Hypertension     MI (myocardial infarction) (HCC)        Past Surgical History:   Procedure Laterality Date    CHOLECYSTECTOMY         Family History   Problem Relation Age of Onset    Hypertension Mother     Breast cancer Mother     Heart attack Father     Hypertension Sister     Cancer Sister     Heart attack Brother      I have reviewed and agree with the history as documented.    E-Cigarette/Vaping     E-Cigarette/Vaping Substances    Nicotine No     THC No     CBD No     Flavoring No     Other No     Unknown No      Social History     Tobacco Use    Smoking status: Never    Smokeless tobacco: Never   Substance Use Topics    Alcohol use: Not Currently    Drug use: Not Currently       Review of Systems   Neurological:  Positive for light-headedness and headaches.   All other systems reviewed and are negative.      Physical Exam  Physical Exam  Vitals and nursing note reviewed.   Constitutional:       General: She is not in acute distress.     Appearance: She is not ill-appearing or toxic-appearing.   HENT:      Head: Normocephalic and atraumatic.      Right Ear: External ear normal.      Left Ear: External ear normal.   Eyes:      Extraocular Movements: Extraocular movements intact.      Pupils: Pupils are equal, round, and reactive to light.   Cardiovascular:      Rate and Rhythm: Normal rate and regular rhythm.      Pulses: Normal pulses.      Heart sounds: No murmur heard.     No friction rub. No gallop.   Pulmonary:      Effort: Pulmonary effort is normal. No respiratory distress.      Breath sounds: No stridor. No wheezing, rhonchi or rales.   Abdominal:      General: There is no distension.      Palpations: Abdomen is soft.      Tenderness: There is no abdominal tenderness.   Musculoskeletal:         General: No swelling, tenderness,  deformity or signs of injury. Normal range of motion.      Cervical back: Normal range of motion and neck supple. No rigidity.      Right lower leg: No edema.      Left lower leg: No edema.   Skin:     General: Skin is warm and dry.      Coloration: Skin is not jaundiced.      Findings: No bruising, erythema or rash.   Neurological:      General: No focal deficit present.      Mental Status: She is alert and oriented to person, place, and time.      Cranial Nerves: No cranial nerve deficit.      Sensory: No sensory deficit.      Motor: No weakness.      Coordination: Coordination normal.      Gait: Gait normal.   Psychiatric:         Mood and Affect: Mood normal.         Behavior: Behavior normal.         Vital Signs  ED Triage Vitals [08/18/24 1536]   Temperature Pulse Respirations Blood Pressure SpO2   97.5 °F (36.4 °C) 74 18 (!) 225/121 98 %      Temp Source Heart Rate Source Patient Position - Orthostatic VS BP Location FiO2 (%)   Oral Monitor Sitting Right arm --      Pain Score       No Pain           Vitals:    08/18/24 1731 08/18/24 1800 08/18/24 1808 08/18/24 1830   BP: (!) 204/93 (!) 187/128 (!) 177/97 (!) 196/93   Pulse: 66 61 68 72   Patient Position - Orthostatic VS: Sitting Sitting Lying Sitting         Visual Acuity      ED Medications  Medications   cephalexin (KEFLEX) capsule 500 mg (500 mg Oral Given 8/18/24 1706)   metoprolol succinate (TOPROL-XL) 24 hr tablet 75 mg (75 mg Oral Given 8/18/24 1706)   losartan (COZAAR) tablet 25 mg (25 mg Oral Given 8/18/24 1818)   losartan (COZAAR) tablet 25 mg (25 mg Oral Given 8/18/24 1853)       Diagnostic Studies  Results Reviewed       Procedure Component Value Units Date/Time    HS Troponin I 2hr [[id number]]  (Normal) Collected: 08/18/24 1809    Lab Status: Final result Specimen: Blood from Arm, Right Updated: 08/18/24 1836     hs TnI 2hr 11 ng/L      Delta 2hr hsTnI 1 ng/L     HS Troponin I 4hr [[id number]]     Lab Status: No result Specimen: Blood     HS  Troponin 0hr (reflex protocol) [[id number]]  (Normal) Collected: 08/18/24 1557    Lab Status: Final result Specimen: Blood from Arm, Right Updated: 08/18/24 1624     hs TnI 0hr 10 ng/L     Comprehensive metabolic panel [[id number]]  (Abnormal) Collected: 08/18/24 1557    Lab Status: Final result Specimen: Blood from Arm, Right Updated: 08/18/24 1617     Sodium 141 mmol/L      Potassium 3.4 mmol/L      Chloride 104 mmol/L      CO2 28 mmol/L      ANION GAP 9 mmol/L      BUN 15 mg/dL      Creatinine 0.87 mg/dL      Glucose 99 mg/dL      Calcium 10.0 mg/dL      AST 17 U/L      ALT 20 U/L      Alkaline Phosphatase 108 U/L      Total Protein 8.0 g/dL      Albumin 4.8 g/dL      Total Bilirubin 1.27 mg/dL      eGFR 69 ml/min/1.73sq m     Narrative:      National Kidney Disease Foundation guidelines for Chronic Kidney Disease (CKD):     Stage 1 with normal or high GFR (GFR > 90 mL/min/1.73 square meters)    Stage 2 Mild CKD (GFR = 60-89 mL/min/1.73 square meters)    Stage 3A Moderate CKD (GFR = 45-59 mL/min/1.73 square meters)    Stage 3B Moderate CKD (GFR = 30-44 mL/min/1.73 square meters)    Stage 4 Severe CKD (GFR = 15-29 mL/min/1.73 square meters)    Stage 5 End Stage CKD (GFR <15 mL/min/1.73 square meters)  Note: GFR calculation is accurate only with a steady state creatinine    Urine Microscopic [[id number]]  (Abnormal) Collected: 08/18/24 1600    Lab Status: Final result Specimen: Urine, Clean Catch Updated: 08/18/24 1617     RBC, UA 10-20 /hpf      WBC, UA 10-20 /hpf      Epithelial Cells Occasional /hpf      Bacteria, UA Occasional /hpf     Urine culture [[id number]] Collected: 08/18/24 1600    Lab Status: In process Specimen: Urine, Clean Catch Updated: 08/18/24 1616    Protime-INR [[id number]]  (Normal) Collected: 08/18/24 1557    Lab Status: Final result Specimen: Blood from Arm, Right Updated: 08/18/24 1616     Protime 13.7 seconds      INR 1.00    Narrative:      INR Therapeutic Range    Indication                                              INR Range      Atrial Fibrillation                                               2.0-3.0  Hypercoagulable State                                    2.0.2.3  Left Ventricular Asist Device                            2.0-3.0  Mechanical Heart Valve                                  -    Aortic(with afib, MI, embolism, HF, LA enlargement,    and/or coagulopathy)                                     2.0-3.0 (2.5-3.5)     Mitral                                                             2.5-3.5  Prosthetic/Bioprosthetic Heart Valve               2.0-3.0  Venous thromboembolism (VTE: VT, PE        2.0-3.0    APTT [[id number]]  (Normal) Collected: 08/18/24 1557    Lab Status: Final result Specimen: Blood from Arm, Right Updated: 08/18/24 1616     PTT 28 seconds     UA w Reflex to Microscopic w Reflex to Culture [[id number]]  (Abnormal) Collected: 08/18/24 1600    Lab Status: Final result Specimen: Urine, Clean Catch Updated: 08/18/24 1609     Color, UA Light Yellow     Clarity, UA Clear     Specific Gravity, UA <1.005     pH, UA 6.5     Leukocytes, UA Moderate     Nitrite, UA Negative     Protein, UA Negative mg/dl      Glucose, UA Negative mg/dl      Ketones, UA Negative mg/dl      Urobilinogen, UA <2.0 mg/dl      Bilirubin, UA Negative     Occult Blood, UA Large    CBC and differential [[id number]]  (Abnormal) Collected: 08/18/24 1557    Lab Status: Final result Specimen: Blood from Arm, Right Updated: 08/18/24 1603     WBC 11.20 Thousand/uL      RBC 5.43 Million/uL      Hemoglobin 16.1 g/dL      Hematocrit 47.3 %      MCV 87 fL      MCH 29.7 pg      MCHC 34.0 g/dL      RDW 12.5 %      MPV 9.8 fL      Platelets 263 Thousands/uL      nRBC 0 /100 WBCs      Segmented % 70 %      Immature Grans % 0 %      Lymphocytes % 21 %      Monocytes % 6 %      Eosinophils Relative 2 %      Basophils Relative 1 %      Absolute Neutrophils 7.90 Thousands/µL      Absolute Immature Grans 0.05 Thousand/uL      Absolute  Lymphocytes 2.37 Thousands/µL      Absolute Monocytes 0.65 Thousand/µL      Eosinophils Absolute 0.17 Thousand/µL      Basophils Absolute 0.06 Thousands/µL                    XR chest 1 view portable   ED Interpretation by Dheeraj Tucker DO (08/18 1636)   No acute infiltrate or congestive heart failure                 Procedures  ECG 12 Lead Documentation Only    Date/Time: 8/18/2024 4:00 PM    Performed by: Dheeraj Tucker DO  Authorized by: Dheeraj Tucker DO    ECG reviewed by me, the ED Provider: yes    Patient location:  ED  Rate:     ECG rate:  70  Rhythm:     Rhythm: sinus rhythm    Conduction:     Conduction: abnormal      Abnormal conduction: complete LBBB    T waves:     T waves: non-specific             ED Course  ED Course as of 08/18/24 2035   Sun Aug 18, 2024   1809 Repeat blood pressure currently is 177/97   1837 Delta troponin is 1               HEART Risk Score      Flowsheet Row Most Recent Value   Heart Score Risk Calculator    History 0 Filed at: 08/18/2024 1643   ECG 1 Filed at: 08/18/2024 1643   Age 2 Filed at: 08/18/2024 1643   Risk Factors 1 Filed at: 08/18/2024 1643   Troponin 0 Filed at: 08/18/2024 1643   HEART Score 4 Filed at: 08/18/2024 1643                          SBIRT 22yo+      Flowsheet Row Most Recent Value   Initial Alcohol Screen: US AUDIT-C     1. How often do you have a drink containing alcohol? 0 Filed at: 08/18/2024 1539   2. How many drinks containing alcohol do you have on a typical day you are drinking?  0 Filed at: 08/18/2024 1539   3b. FEMALE Any Age, or MALE 65+: How often do you have 4 or more drinks on one occassion? 0 Filed at: 08/18/2024 1539   Audit-C Score 0 Filed at: 08/18/2024 1539   ALBINA: How many times in the past year have you...    Used an illegal drug or used a prescription medication for non-medical reasons? Never Filed at: 08/18/2024 1539                      Medical Decision Making  Significant hypertension will check EKG lab work urinalysis and  monitor may need medical intervention    Amount and/or Complexity of Data Reviewed  Labs: ordered.  Radiology: ordered and independent interpretation performed.    Risk  Prescription drug management.                 Disposition  Final diagnoses:   Hypertension   UTI (urinary tract infection)     Time reflects when diagnosis was documented in both MDM as applicable and the Disposition within this note       Time User Action Codes Description Comment    8/18/2024  4:54 PM Dheeraj Tucker Add [I10] Hypertension     8/18/2024  4:54 PM Dheeraj Tucker [N39.0] UTI (urinary tract infection)           ED Disposition       ED Disposition   Discharge    Condition   Stable    Date/Time   Sun Aug 18, 2024 1850    Comment   Michelle Espino discharge to home/self care.                   Follow-up Information       Follow up With Specialties Details Why Contact Info    Loly Khan,  Family Medicine In 3 days Recheck blood pressure [address] [facility] [address] [phone]      Shay Garcia MD Cardiology, Multidisciplinary In 3 days  [facility] [address] [address] [phone]              Discharge Medication List as of 8/18/2024  6:51 PM        START taking these medications    Details   cephalexin (KEFLEX) 500 mg capsule Take 1 capsule (500 mg total) by mouth 2 (two) times a day for 7 days Do not start before August 19, 2024., Starting Mon 8/19/2024, Until Mon 8/26/2024, Normal           CONTINUE these medications which have NOT CHANGED    Details   amLODIPine (NORVASC) 10 mg tablet Take 10 mg by mouth daily, Starting Wed 6/14/2023, Until Thu 6/13/2024, Historical Med      aspirin (ECOTRIN LOW STRENGTH) 81 mg EC tablet Take 81 mg by mouth daily, Starting Thu 12/7/2023, Historical Med      atorvastatin (LIPITOR) 80 mg tablet Take 1 tablet by mouth every evening, Starting Wed 12/6/2023, Historical Med      Cholecalciferol (Vitamin D3) 25 MCG (1000 UT) CAPS Take 1,000 Units by mouth daily,  Historical Med      clopidogrel (PLAVIX) 75 mg tablet Take 75 mg by mouth daily, Starting Thu 12/7/2023, Historical Med      ezetimibe (ZETIA) 10 mg tablet Take 10 mg by mouth daily, Starting Wed 12/6/2023, Historical Med      losartan (COZAAR) 50 mg tablet Take 1 tablet (50 mg total) by mouth daily, Starting Thu 2/8/2024, Normal      metoprolol succinate (TOPROL-XL) 25 mg 24 hr tablet Take 75 mg by mouth every 12 (twelve) hours, Starting Wed 12/6/2023, Historical Med      Omega-3 Fatty Acids (OMEGA-3 FISH OIL PO) Take 1 capsule by mouth in the morning, Historical Med      Thiamine HCl (Vitamin B1) 50 MG TABS Take 50 mg by mouth daily, Historical Med             No discharge procedures on file.    PDMP Review       None            ED Provider  Electronically Signed by             Dheeraj Tucker DO  08/18/24 2035

## 2024-08-18 NOTE — DISCHARGE INSTRUCTIONS
Increase losartan to 50 mg twice a day until you follow-up with your cardiologist or family doctor for blood pressure recheck

## 2024-08-19 LAB — BACTERIA UR CULT: NORMAL

## 2024-08-20 LAB
ATRIAL RATE: 70 BPM
P AXIS: 64 DEGREES
PR INTERVAL: 182 MS
QRS AXIS: -7 DEGREES
QRSD INTERVAL: 156 MS
QT INTERVAL: 458 MS
QTC INTERVAL: 494 MS
T WAVE AXIS: 86 DEGREES
VENTRICULAR RATE: 70 BPM

## 2024-08-20 PROCEDURE — 93010 ELECTROCARDIOGRAM REPORT: CPT | Performed by: INTERNAL MEDICINE

## 2024-10-14 ENCOUNTER — REMOTE DEVICE CLINIC VISIT (OUTPATIENT)
Dept: CARDIOLOGY CLINIC | Facility: CLINIC | Age: 69
End: 2024-10-14
Payer: MEDICARE

## 2024-10-14 DIAGNOSIS — Z95.810 PRESENCE OF IMPLANTABLE CARDIOVERTER-DEFIBRILLATOR (ICD): Primary | ICD-10-CM

## 2024-10-14 PROCEDURE — 93296 REM INTERROG EVL PM/IDS: CPT | Performed by: INTERNAL MEDICINE

## 2024-10-14 PROCEDURE — 93295 DEV INTERROG REMOTE 1/2/MLT: CPT | Performed by: INTERNAL MEDICINE

## 2024-10-14 NOTE — PROGRESS NOTES
MDT SC ICD/ACTIVE SYSTEM IS MRI CONDITIONAL   CARELINK TRANSMISSION:  BATTERY VOLTAGE ADEQUATE (10.9 YR.).   <0.1%.  ALL LEAD PARAMETERS WITHIN NORMAL LIMITS.  NO SIGNIFICANT HIGH RATE EPISODES.  OPTI-VOL WITHIN NORMAL LIMITS.  NORMAL DEVICE FUNCTION.  RG

## 2025-01-16 ENCOUNTER — REMOTE DEVICE CLINIC VISIT (OUTPATIENT)
Dept: CARDIOLOGY CLINIC | Facility: CLINIC | Age: 70
End: 2025-01-16
Payer: MEDICARE

## 2025-01-16 DIAGNOSIS — I47.20 VENTRICULAR TACHYCARDIA (HCC): Primary | ICD-10-CM

## 2025-01-16 PROCEDURE — 93295 DEV INTERROG REMOTE 1/2/MLT: CPT | Performed by: INTERNAL MEDICINE

## 2025-01-16 PROCEDURE — 93296 REM INTERROG EVL PM/IDS: CPT | Performed by: INTERNAL MEDICINE

## 2025-01-16 NOTE — PROGRESS NOTES
Results for orders placed or performed in visit on 01/16/25   Cardiac EP device report    Narrative    MDT SC ICD/ACTIVE SYSTEM IS MRI CONDITIONAL  CARELINK TRANSMISSION: BATTERY VOLTAGE ADEQUATE (10.8 YRS). : <0.1%. ALL AVAILABLE LEAD PARAMETERS WITHIN NORMAL LIMITS. NO SIGNIFICANT HIGH RATE EPISODES. OPTI-VOL WITHIN NORMAL LIMITS. NORMAL DEVICE FUNCTION. CH

## 2025-01-19 ENCOUNTER — RESULTS FOLLOW-UP (OUTPATIENT)
Dept: CARDIOLOGY CLINIC | Facility: CLINIC | Age: 70
End: 2025-01-19

## 2025-02-07 ENCOUNTER — TELEPHONE (OUTPATIENT)
Dept: CARDIOLOGY CLINIC | Facility: CLINIC | Age: 70
End: 2025-02-07

## 2025-02-07 NOTE — TELEPHONE ENCOUNTER
Called patient & left message asking for a call back to schedule follow up in a month per Dr. Garcia.  If nothing for Dr. Garcia, we may put her with Darrell or Starr.      Thank you.

## 2025-02-07 NOTE — TELEPHONE ENCOUNTER
----- Message from Sharyn PACE sent at 2/4/2025  4:04 PM EST -----  Regarding: FW: Follow-up    ----- Message -----  From: Shay Garcia MD  Sent: 1/31/2025   1:00 PM EST  To: Cardiology [place] [facility]  Subject: Follow-up                                            I believe this patient is overdue for a follow-up.  Can we try to get her in for an appointment sometime in the next month??  Thank you

## 2025-03-26 ENCOUNTER — OFFICE VISIT (OUTPATIENT)
Dept: CARDIOLOGY CLINIC | Facility: CLINIC | Age: 70
End: 2025-03-26
Payer: COMMERCIAL

## 2025-03-26 VITALS
WEIGHT: 191.8 LBS | DIASTOLIC BLOOD PRESSURE: 76 MMHG | SYSTOLIC BLOOD PRESSURE: 142 MMHG | HEIGHT: 60 IN | BODY MASS INDEX: 37.66 KG/M2 | HEART RATE: 80 BPM

## 2025-03-26 DIAGNOSIS — Z95.810 ICD (IMPLANTABLE CARDIOVERTER-DEFIBRILLATOR) IN PLACE: ICD-10-CM

## 2025-03-26 DIAGNOSIS — E78.00 HYPERCHOLESTEROLEMIA: ICD-10-CM

## 2025-03-26 DIAGNOSIS — I25.10 MULTIPLE VESSEL CORONARY ARTERY DISEASE: ICD-10-CM

## 2025-03-26 DIAGNOSIS — I47.20 VT (VENTRICULAR TACHYCARDIA) (HCC): ICD-10-CM

## 2025-03-26 DIAGNOSIS — I10 PRIMARY HYPERTENSION: ICD-10-CM

## 2025-03-26 DIAGNOSIS — I25.10 CORONARY ARTERY DISEASE INVOLVING NATIVE CORONARY ARTERY OF NATIVE HEART WITHOUT ANGINA PECTORIS: Primary | ICD-10-CM

## 2025-03-26 PROCEDURE — 99214 OFFICE O/P EST MOD 30 MIN: CPT | Performed by: INTERNAL MEDICINE

## 2025-03-26 RX ORDER — METOPROLOL SUCCINATE 25 MG/1
75 TABLET, EXTENDED RELEASE ORAL EVERY 12 HOURS
Qty: 90 TABLET | Refills: 3 | Status: SHIPPED | OUTPATIENT
Start: 2025-03-26 | End: 2025-03-26 | Stop reason: SDUPTHER

## 2025-03-26 RX ORDER — HYDROCHLOROTHIAZIDE 25 MG/1
25 TABLET ORAL DAILY
COMMUNITY
Start: 2024-08-27 | End: 2025-03-26 | Stop reason: SDUPTHER

## 2025-03-26 RX ORDER — AMLODIPINE BESYLATE 10 MG/1
10 TABLET ORAL DAILY
Qty: 90 TABLET | Refills: 3 | Status: SHIPPED | OUTPATIENT
Start: 2025-03-26 | End: 2027-01-06

## 2025-03-26 RX ORDER — HYDROCHLOROTHIAZIDE 25 MG/1
25 TABLET ORAL DAILY
Qty: 90 TABLET | Refills: 3 | Status: SHIPPED | OUTPATIENT
Start: 2025-03-26 | End: 2026-03-26

## 2025-03-26 RX ORDER — EZETIMIBE 10 MG/1
10 TABLET ORAL DAILY
Qty: 90 TABLET | Refills: 3 | Status: SHIPPED | OUTPATIENT
Start: 2025-03-26

## 2025-03-26 RX ORDER — CLOPIDOGREL BISULFATE 75 MG/1
75 TABLET ORAL DAILY
Qty: 90 TABLET | Refills: 3 | Status: SHIPPED | OUTPATIENT
Start: 2025-03-26

## 2025-03-26 RX ORDER — LOSARTAN POTASSIUM 50 MG/1
50 TABLET ORAL DAILY
Qty: 90 TABLET | Refills: 3 | Status: SHIPPED | OUTPATIENT
Start: 2025-03-26

## 2025-03-26 RX ORDER — METOPROLOL SUCCINATE 50 MG/1
75 TABLET, EXTENDED RELEASE ORAL EVERY 12 HOURS
Qty: 135 TABLET | Refills: 3 | Status: SHIPPED | OUTPATIENT
Start: 2025-03-26

## 2025-03-26 NOTE — PROGRESS NOTES
Cardiology Consultation     Michelle Espino  [id number]  1955  [facility] [address] [address] [phone]    1. Coronary artery disease involving native coronary artery of native heart without angina pectoris  amLODIPine (NORVASC) 10 mg tablet    clopidogrel (PLAVIX) 75 mg tablet    metoprolol succinate (TOPROL-XL) 50 mg 24 hr tablet    Comprehensive metabolic panel    Lipid Panel with Direct LDL reflex    CBC (Includes Diff/Plt) (Refl)    DISCONTINUED: metoprolol succinate (TOPROL-XL) 25 mg 24 hr tablet      2. Multiple vessel coronary artery disease        3. Hypercholesterolemia  ezetimibe (ZETIA) 10 mg tablet      4. VT (ventricular tachycardia) (HCC)        5. ICD (implantable cardioverter-defibrillator) in place        6. Primary hypertension  amLODIPine (NORVASC) 10 mg tablet    hydroCHLOROthiazide 25 mg tablet    losartan (COZAAR) 50 mg tablet    metoprolol succinate (TOPROL-XL) 50 mg 24 hr tablet    DISCONTINUED: metoprolol succinate (TOPROL-XL) 25 mg 24 hr tablet            Discussion/Summary:    1.  Multivessel CAD - After a recent NSTEMI and VT/PEA arrest Michelle was found to have multivessel disease.  The culprit of her MI appeared to be an occluded OM1, but she had diffuse disease of her left circumflex starting proximally along with a mid long eccentric stenosis of her LAD at 80%.  She was not a candidate for intervention, and CT surgery was consulted.  They stated there was no good targets distally and medical management was recommended.  She came here as a second opinion in which our interventional/surgical colleagues felt that PCI was an option, albeit higher risk.  Remained asymptomatic we continued with medical therapy and cardiac rehabilitation which has helped.  Her LV function is normal.  She we will continue the same medical therapy and she knows to call if any  angina returns.  She will be back to see us in 6 months.    2.   Ventricular tachycardia - At the time of her presentation she had VTA that was reported integrate into PEA arrest, that only lasted less than a minute.  She received lidocaine and CPR.  She is now status post ICD and on beta-blocker therapy.  She has established with our device clinic and her device checks have been normal without arrhythmias.    3.   Hypertension - Her blood pressure is mildly elevated.  However she was running low on her medications and cut down the dose of the metoprolol.  We will go back to her regular dosing schedule and she will be on Toprol-XL at 75 mg twice daily, losartan to 50 mg daily and amlodipine 10 mg daily.    4.  Hypercholesterolemia - She is now on atorvastatin 80 mg daily with the addition of Zetia 10 mg daily.  We did order updated blood work.        HPI:    Mrs. Espino comes in today for follow-up given her cardiac history.  Last year I saw her as a new patient as a second opinion.  She is a 68-year-old female who came into the hospital on 12/1/2023 with a couple days of what started as jaw pain but then progressed to substernal chest pain.  Prior to this she had no cardiac history and was treated for hypertension and hyperlipidemia.  While en route to the hospital via EMS she was noted to have pulseless VT and this degraded into what was reported as a PEA arrest.  She received an IV dose of lidocaine and CPR.  Her arrest did not last very long, less than a minute, and upon arrival to the emergency department ECG showed a new left bundle branch block.  She subsequently had a significantly elevated troponin.    At that point she was taken to the cardiac Cath Lab in which she had multivessel disease.  She had a eccentric long mid LAD 80% stenosis, a long diffuse 80% LCx stenosis starting proximally with a medium caliber OM1, that was torturous and occluded distally, and just mild plaquing of the RCA.  They reported  they felt the culprit of her NSTEMI was the occlusion of OM1.  They did not feel intervention was the best course given the long diffuse stenoses proximal to this in the LAD and therefore they consulted CT surgery.  Echocardiogram did show a preserved LV function.  By report, the patient was told she was not a candidate for CABG due to suboptimal coronary targets given her diffuse disease.  At that point she was placed on maximal and goal-directed medical therapy and an ICD was placed for her VT given the fact that she was not revascularized.  Follow-up was arranged, but then her and her  sought a second opinion today here with [facility].    At our initial consultation Michelle was not having angina type symptoms.  She was having some shortness of breath, but her stamina had dropped since her hospitalization as she was not active at all.  I at that point had her go through cardiac rehabilitation which did help her stamina.  I also discussed the case with interventional cardiology and she would be a possible candidate for intervention, albeit a higher risk case.  At that point since she was asymptomatic we continued medical management and she is here for follow-up.    Michelle continues to feel well.  She has not had any return of angina type symptoms that took her to the hospital.  She has been active but is not quite at the activity she once was.  She used to do the treadmill but has not done this since getting out of the hospital.  She is tolerating all of her medical therapy.  She does get some mild shortness of breath with upper levels of exertion, which seem to be improved since last year and since doing cardiac rehabilitation.  She is active without limitations to her level of activity.  She denies any other signs or symptoms of CHF.  No palpitations, lightheadedness or syncope.  Her ICD checks have been normal without arrhythmias.      Patient Active Problem List   Diagnosis    Coronary artery disease  involving native coronary artery of native heart without angina pectoris    Multiple vessel coronary artery disease    NSTEMI (non-ST elevated myocardial infarction) ([facility])    VT (ventricular tachycardia) ([facility])    Hypercholesterolemia    ICD (implantable cardioverter-defibrillator) in place     Past Medical History:   Diagnosis Date    Hypertension     MI (myocardial infarction) ([facility])      Social History     Socioeconomic History    Marital status: /Civil Union     Spouse name: Not on file    Number of children: Not on file    Years of education: Not on file    Highest education level: Not on file   Occupational History    Not on file   Tobacco Use    Smoking status: Never    Smokeless tobacco: Never   Vaping Use    Vaping status: Not on file   Substance and Sexual Activity    Alcohol use: Not Currently    Drug use: Not Currently    Sexual activity: Not on file   Other Topics Concern    Not on file   Social History Narrative    Not on file     Social Drivers of Health     Financial Resource Strain: Low Risk  (2/12/2024)    Received from [employer]    Overall Financial Resource Strain (CARDIA)     Difficulty of Paying Living Expenses: Not hard at all   Food Insecurity: Not on file   Transportation Needs: Not on file   Physical Activity: Not on file   Stress: Not on file   Social Connections: Not on file   Intimate Partner Violence: Not on file   Housing Stability: Unknown (2/12/2024)    Received from [employer]    Housing Stability Vital Sign     (RETIRED) Do you struggle to pay the rent or mortgage on time?: No     Number of Places Lived in the Last Year: Not on file     (RETIRED) Is your current living situation unsteady?   (I.e. Staying with others,  in a hotel, in a shelter, living outside: on the street, on a beach, in a car, abandoned building, bus, train stat...: No      Family History   Problem Relation Age of Onset    Hypertension Mother     Breast cancer Mother      Heart attack Father     Hypertension Sister     Cancer Sister     Heart attack Brother      Past Surgical History:   Procedure Laterality Date    CHOLECYSTECTOMY         Current Outpatient Medications:     amLODIPine (NORVASC) 10 mg tablet, Take 1 tablet (10 mg total) by mouth daily, Disp: 90 tablet, Rfl: 3    aspirin (ECOTRIN LOW STRENGTH) 81 mg EC tablet, Take 81 mg by mouth daily, Disp: , Rfl:     atorvastatin (LIPITOR) 80 mg tablet, Take 1 tablet by mouth every evening, Disp: , Rfl:     Cholecalciferol (Vitamin D3) 25 MCG (1000 UT) CAPS, Take 1,000 Units by mouth daily, Disp: , Rfl:     clopidogrel (PLAVIX) 75 mg tablet, Take 1 tablet (75 mg total) by mouth daily, Disp: 90 tablet, Rfl: 3    ezetimibe (ZETIA) 10 mg tablet, Take 1 tablet (10 mg total) by mouth daily, Disp: 90 tablet, Rfl: 3    hydroCHLOROthiazide 25 mg tablet, Take 1 tablet (25 mg total) by mouth daily, Disp: 90 tablet, Rfl: 3    losartan (COZAAR) 50 mg tablet, Take 1 tablet (50 mg total) by mouth daily, Disp: 90 tablet, Rfl: 3    metoprolol succinate (TOPROL-XL) 50 mg 24 hr tablet, Take 1.5 tablets (75 mg total) by mouth every 12 (twelve) hours, Disp: 135 tablet, Rfl: 3    Omega-3 Fatty Acids (OMEGA-3 FISH OIL PO), Take 1 capsule by mouth in the morning, Disp: , Rfl:     Thiamine HCl (Vitamin B1) 50 MG TABS, Take 50 mg by mouth daily, Disp: , Rfl:   Allergies   Allergen Reactions    Codeine Nausea Only and Other (See Comments)     Nausea and abdominal pain     Vitals:    03/26/25 0929   BP: 142/76   BP Location: Left arm   Patient Position: Sitting   Cuff Size: Standard   Pulse: 80   Weight: 87 kg (191 lb 12.8 oz)   Height: 5' (1.524 m)       Labs:  Labs reviewed from over the last year.    Imaging:  ECG obtained at the time of her hospitalization shows sinus rhythm with a left bundle branch block.    Review of Systems:  Review of Systems   Constitutional:  Positive for fatigue.   HENT: Negative.     Eyes: Negative.    Respiratory:  Positive for  shortness of breath.    Cardiovascular: Negative.    Gastrointestinal: Negative.    Musculoskeletal: Negative.    Skin: Negative.    Allergic/Immunologic: Negative.    Neurological: Negative.    Hematological: Negative.    Psychiatric/Behavioral: Negative.     All other systems reviewed and are negative.    Vitals:    03/26/25 0929   BP: 142/76   BP Location: Left arm   Patient Position: Sitting   Cuff Size: Standard   Pulse: 80   Weight: 87 kg (191 lb 12.8 oz)   Height: 5' (1.524 m)     Physical Exam  Vitals and nursing note reviewed.   Constitutional:       Appearance: She is well-developed.   HENT:      Head: Normocephalic and atraumatic.   Eyes:      General: No scleral icterus.        Right eye: No discharge.         Left eye: No discharge.      Pupils: Pupils are equal, round, and reactive to light.   Neck:      Thyroid: No thyromegaly.      Vascular: No JVD.   Cardiovascular:      Rate and Rhythm: Normal rate and regular rhythm. No extrasystoles are present.     Pulses: Normal pulses. No decreased pulses.      Heart sounds: Normal heart sounds, S1 normal and S2 normal. No murmur heard.     No friction rub. No gallop.   Pulmonary:      Effort: Pulmonary effort is normal. No respiratory distress.      Breath sounds: Normal breath sounds. No wheezing, rhonchi or rales.   Abdominal:      General: Bowel sounds are normal. There is no distension.      Palpations: Abdomen is soft.      Tenderness: There is no abdominal tenderness.   Musculoskeletal:         General: No tenderness or deformity. Normal range of motion.      Cervical back: Normal range of motion and neck supple.      Right lower leg: No edema.      Left lower leg: No edema.   Skin:     General: Skin is warm and dry.      Findings: No rash.   Neurological:      Mental Status: She is alert and oriented to person, place, and time.      Cranial Nerves: No cranial nerve deficit.   Psychiatric:         Thought Content: Thought content normal.          Judgment: Judgment normal.     Counseling / Coordination of Care  Total office time spent today 25 minutes.  Greater than 50% of total time was spent with the patient and / or family counseling and / or coordination of care.

## 2025-04-21 ENCOUNTER — REMOTE DEVICE CLINIC VISIT (OUTPATIENT)
Dept: CARDIOLOGY CLINIC | Facility: CLINIC | Age: 70
End: 2025-04-21
Payer: COMMERCIAL

## 2025-04-21 DIAGNOSIS — Z95.810 PRESENCE OF AUTOMATIC CARDIOVERTER/DEFIBRILLATOR (AICD): Primary | ICD-10-CM

## 2025-04-21 PROCEDURE — 93295 DEV INTERROG REMOTE 1/2/MLT: CPT | Performed by: INTERNAL MEDICINE

## 2025-04-21 PROCEDURE — 93296 REM INTERROG EVL PM/IDS: CPT | Performed by: INTERNAL MEDICINE

## 2025-04-21 NOTE — PROGRESS NOTES
Results for orders placed or performed in visit on 04/21/25   Cardiac EP device report    Narrative    MDT SC ICD/ACTIVE SYSTEM IS MRI CONDITIONAL  CARELINK TRANSMISSION: BATTERY VOLTAGE ADEQUATE. (10.6 YRS)  <1%. ALL AVAILABLE LEAD PARAMETERS WITHIN NORMAL LIMITS. NO SIGNIFICANT HIGH RATE EPISODES. SINGLE PVC COUNT 1.7/HOUR. OPTI-VOL WITHIN NORMAL LIMITS. NORMAL DEVICE FUNCTION.---GRIFFITH

## 2025-04-26 ENCOUNTER — RESULTS FOLLOW-UP (OUTPATIENT)
Dept: CARDIOLOGY CLINIC | Facility: CLINIC | Age: 70
End: 2025-04-26

## 2025-05-05 DIAGNOSIS — I25.10 CORONARY ARTERY DISEASE INVOLVING NATIVE CORONARY ARTERY OF NATIVE HEART WITHOUT ANGINA PECTORIS: ICD-10-CM

## 2025-05-05 DIAGNOSIS — I10 PRIMARY HYPERTENSION: ICD-10-CM

## 2025-05-06 RX ORDER — METOPROLOL SUCCINATE 50 MG/1
75 TABLET, EXTENDED RELEASE ORAL EVERY 12 HOURS
Qty: 270 TABLET | Refills: 1 | Status: SHIPPED | OUTPATIENT
Start: 2025-05-06

## 2025-07-23 ENCOUNTER — REMOTE DEVICE CLINIC VISIT (OUTPATIENT)
Dept: CARDIOLOGY CLINIC | Facility: CLINIC | Age: 70
End: 2025-07-23
Payer: COMMERCIAL

## 2025-07-23 DIAGNOSIS — Z95.810 AICD (AUTOMATIC CARDIOVERTER/DEFIBRILLATOR) PRESENT: Primary | ICD-10-CM

## 2025-07-23 PROCEDURE — 93296 REM INTERROG EVL PM/IDS: CPT | Performed by: INTERNAL MEDICINE

## 2025-07-23 PROCEDURE — 93295 DEV INTERROG REMOTE 1/2/MLT: CPT | Performed by: INTERNAL MEDICINE

## 2025-07-23 NOTE — PROGRESS NOTES
Results for orders placed or performed in visit on 07/23/25   Cardiac EP device report    Narrative    MDT SC ICD/ACTIVE SYSTEM IS MRI CONDITIONAL  CARELINK TRANSMISSION: BATTERY VOLTAGE ADEQUATE (10.4 YRS). <0.1%. ALL AVAILABLE LEAD PARAMETERS WITHIN NORMAL LIMITS. NO SIGNIFICANT HIGH RATE EPISODES. OPTI-VOL WITHIN NORMAL LIMITS. NORMAL DEVICE FUNCTION. GV